# Patient Record
Sex: FEMALE | Race: WHITE | NOT HISPANIC OR LATINO | Employment: FULL TIME | URBAN - METROPOLITAN AREA
[De-identification: names, ages, dates, MRNs, and addresses within clinical notes are randomized per-mention and may not be internally consistent; named-entity substitution may affect disease eponyms.]

---

## 2023-04-19 LAB — CHORIOGONADOTROPIN (MIU/ML) IN SER/PLAS: 243 MIU/ML

## 2023-04-21 LAB — CHORIOGONADOTROPIN (MIU/ML) IN SER/PLAS: 827 MIU/ML

## 2023-04-22 LAB — PROGESTERONE (NG/ML) IN SER/PLAS: 12.7 NG/ML

## 2023-05-10 LAB
ERYTHROCYTE DISTRIBUTION WIDTH (RATIO) BY AUTOMATED COUNT: 13.2 % (ref 11.5–14.5)
ERYTHROCYTE MEAN CORPUSCULAR HEMOGLOBIN CONCENTRATION (G/DL) BY AUTOMATED: 32.4 G/DL (ref 32–36)
ERYTHROCYTE MEAN CORPUSCULAR VOLUME (FL) BY AUTOMATED COUNT: 83 FL (ref 80–100)
ERYTHROCYTES (10*6/UL) IN BLOOD BY AUTOMATED COUNT: 4.43 X10E12/L (ref 4–5.2)
HEMATOCRIT (%) IN BLOOD BY AUTOMATED COUNT: 36.7 % (ref 36–46)
HEMOGLOBIN (G/DL) IN BLOOD: 11.9 G/DL (ref 12–16)
LEUKOCYTES (10*3/UL) IN BLOOD BY AUTOMATED COUNT: 8 X10E9/L (ref 4.4–11.3)
PLATELETS (10*3/UL) IN BLOOD AUTOMATED COUNT: 270 X10E9/L (ref 150–450)
REFLEX ADDED, ANEMIA PANEL: ABNORMAL

## 2023-05-11 LAB
ABO GROUP (TYPE) IN BLOOD: NORMAL
ANTIBODY SCREEN: NORMAL
CHLAMYDIA TRACH., AMPLIFIED: NEGATIVE
ESTIMATED AVERAGE GLUCOSE FOR HBA1C: 123 MG/DL
HEMOGLOBIN A1C/HEMOGLOBIN TOTAL IN BLOOD: 5.9 %
HEPATITIS B VIRUS SURFACE AG PRESENCE IN SERUM: NONREACTIVE
HIV 1/ 2 AG/AB SCREEN: NONREACTIVE
N. GONORRHEA, AMPLIFIED: NEGATIVE
RH FACTOR: NORMAL
SYPHILIS TOTAL AB: NONREACTIVE

## 2023-05-12 LAB
RUBELLA VIRUS IGG AB: POSITIVE
URINE CULTURE: NORMAL

## 2023-07-01 LAB
GLUCOSE THREE HOUR: 56 MG/DL
GLUCOSE TWO HOUR: 103 MG/DL
GLUCOSE, FASTING: 80 MG/DL
GLUCOSE, ONE HOUR: 140 MG/DL
GTTCM: NORMAL

## 2023-09-01 LAB — URINE CULTURE: NORMAL

## 2023-09-15 PROBLEM — R30.0 DYSURIA: Status: ACTIVE | Noted: 2023-09-15

## 2023-09-15 PROBLEM — M25.532 LEFT WRIST PAIN: Status: ACTIVE | Noted: 2023-09-15

## 2023-09-15 PROBLEM — O36.80X0 PREGNANCY WITH INCONCLUSIVE FETAL VIABILITY (HHS-HCC): Status: ACTIVE | Noted: 2023-09-15

## 2023-09-15 PROBLEM — O09.299: Status: ACTIVE | Noted: 2023-09-15

## 2023-09-15 PROBLEM — F41.9 ANXIETY AND DEPRESSION: Status: ACTIVE | Noted: 2019-05-20

## 2023-09-15 PROBLEM — F42.9 OCD (OBSESSIVE COMPULSIVE DISORDER): Status: ACTIVE | Noted: 2023-09-15

## 2023-09-15 PROBLEM — O02.1 MISSED ABORTION (HHS-HCC): Status: ACTIVE | Noted: 2023-09-15

## 2023-09-15 PROBLEM — F32.1 MAJOR DEPRESSIVE DISORDER, SINGLE EPISODE, MODERATE WITH ANXIOUS DISTRESS (MULTI): Status: ACTIVE | Noted: 2023-09-15

## 2023-09-15 PROBLEM — O36.5991 SGA (SMALL FOR GESTATIONAL AGE), FETAL, AFFECTING CARE OF MOTHER, ANTEPARTUM, UNSPECIFIED TRIMESTER, FETUS 1 (HHS-HCC): Status: ACTIVE | Noted: 2023-09-15

## 2023-09-15 PROBLEM — O99.810 GLUCOSE INTOLERANCE OF PREGNANCY (HHS-HCC): Status: ACTIVE | Noted: 2023-09-15

## 2023-09-15 PROBLEM — N96 RECURRENT PREGNANCY LOSS WITHOUT CURRENT PREGNANCY: Status: ACTIVE | Noted: 2023-09-15

## 2023-09-15 PROBLEM — F41.0 PANIC DISORDER: Status: ACTIVE | Noted: 2023-09-15

## 2023-09-15 PROBLEM — Z34.83 MULTIGRAVIDA IN THIRD TRIMESTER (HHS-HCC): Status: ACTIVE | Noted: 2023-09-15

## 2023-09-15 PROBLEM — N92.6 MISSED PERIOD: Status: ACTIVE | Noted: 2023-09-15

## 2023-09-15 PROBLEM — R31.9 HEMATURIA: Status: ACTIVE | Noted: 2023-09-15

## 2023-09-15 PROBLEM — F32.A ANXIETY AND DEPRESSION: Status: ACTIVE | Noted: 2019-05-20

## 2023-09-15 PROBLEM — O99.013 ANEMIA, ANTEPARTUM, THIRD TRIMESTER (HHS-HCC): Status: ACTIVE | Noted: 2023-09-15

## 2023-09-15 RX ORDER — FLUOXETINE HYDROCHLORIDE 40 MG/1
40 CAPSULE ORAL DAILY
COMMUNITY
Start: 2016-02-22 | End: 2024-04-23 | Stop reason: ALTCHOICE

## 2023-09-15 RX ORDER — NITROFURANTOIN 25; 75 MG/1; MG/1
100 CAPSULE ORAL 2 TIMES DAILY
COMMUNITY
Start: 2023-08-30 | End: 2023-10-05 | Stop reason: WASHOUT

## 2023-09-15 RX ORDER — BUSPIRONE HYDROCHLORIDE 15 MG/1
15 TABLET ORAL EVERY 24 HOURS
COMMUNITY
Start: 2018-04-20 | End: 2024-04-23 | Stop reason: ALTCHOICE

## 2023-09-15 RX ORDER — ASPIRIN 81 MG/1
2 TABLET ORAL DAILY
COMMUNITY
End: 2023-12-14 | Stop reason: HOSPADM

## 2023-09-15 RX ORDER — CALCIUM CARBONATE 300MG(750)
1 TABLET,CHEWABLE ORAL DAILY
COMMUNITY
End: 2024-04-23 | Stop reason: ALTCHOICE

## 2023-09-16 PROBLEM — R13.10 DYSPHAGIA: Status: ACTIVE | Noted: 2023-09-16

## 2023-09-19 ENCOUNTER — HOSPITAL ENCOUNTER (OUTPATIENT)
Dept: DATA CONVERSION | Facility: HOSPITAL | Age: 33
End: 2023-09-19
Attending: OBSTETRICS & GYNECOLOGY
Payer: COMMERCIAL

## 2023-09-19 DIAGNOSIS — O99.342 OTHER MENTAL DISORDERS COMPLICATING PREGNANCY, SECOND TRIMESTER (HHS-HCC): ICD-10-CM

## 2023-09-19 DIAGNOSIS — Z79.82 LONG TERM (CURRENT) USE OF ASPIRIN: ICD-10-CM

## 2023-09-19 DIAGNOSIS — Z20.822 CONTACT WITH AND (SUSPECTED) EXPOSURE TO COVID-19: ICD-10-CM

## 2023-09-19 DIAGNOSIS — R50.9 FEVER, UNSPECIFIED: ICD-10-CM

## 2023-09-19 DIAGNOSIS — F32.9 MAJOR DEPRESSIVE DISORDER, SINGLE EPISODE, UNSPECIFIED: ICD-10-CM

## 2023-09-19 DIAGNOSIS — Z79.899 OTHER LONG TERM (CURRENT) DRUG THERAPY: ICD-10-CM

## 2023-09-19 DIAGNOSIS — R51.9 HEADACHE, UNSPECIFIED: ICD-10-CM

## 2023-09-19 DIAGNOSIS — Z3A.26 26 WEEKS GESTATION OF PREGNANCY (HHS-HCC): ICD-10-CM

## 2023-09-19 DIAGNOSIS — O09.292 SUPERVISION OF PREGNANCY WITH OTHER POOR REPRODUCTIVE OR OBSTETRIC HISTORY, SECOND TRIMESTER (HHS-HCC): ICD-10-CM

## 2023-09-19 DIAGNOSIS — O26.892 OTHER SPECIFIED PREGNANCY RELATED CONDITIONS, SECOND TRIMESTER (HHS-HCC): ICD-10-CM

## 2023-09-19 LAB
ERYTHROCYTE DISTRIBUTION WIDTH (RATIO) BY AUTOMATED COUNT: 12.8 % (ref 11.5–14.5)
ERYTHROCYTE MEAN CORPUSCULAR HEMOGLOBIN CONCENTRATION (G/DL) BY AUTOMATED: 32.6 G/DL (ref 32–36)
ERYTHROCYTE MEAN CORPUSCULAR VOLUME (FL) BY AUTOMATED COUNT: 86 FL (ref 80–100)
ERYTHROCYTES (10*6/UL) IN BLOOD BY AUTOMATED COUNT: 3.78 X10E12/L (ref 4–5.2)
FLU A RESULT: NOT DETECTED
FLU B RESULT: NOT DETECTED
HEMATOCRIT (%) IN BLOOD BY AUTOMATED COUNT: 32.5 % (ref 36–46)
HEMOGLOBIN (G/DL) IN BLOOD: 10.6 G/DL (ref 12–16)
LEUKOCYTES (10*3/UL) IN BLOOD BY AUTOMATED COUNT: 12.2 X10E9/L (ref 4.4–11.3)
NRBC (PER 100 WBCS) BY AUTOMATED COUNT: 0 /100 WBC (ref 0–0)
PLATELETS (10*3/UL) IN BLOOD AUTOMATED COUNT: 296 X10E9/L (ref 150–450)
SARS-COV-2 RESULT: NOT DETECTED

## 2023-09-29 VITALS — HEIGHT: 61 IN | WEIGHT: 221.56 LBS | BODY MASS INDEX: 41.83 KG/M2

## 2023-09-30 NOTE — PROGRESS NOTES
Current Stage:   Stage: Triage     OB Dating:   EDC/EGA:  ·  Final JEM 25-Dec-2023   ·  EGA 26.1     Subjective Data:   Antepartum:  Vaginal Bleeding: No   Contractions/Abdominal Pain: No   Discharge/Loss of Fluid: No   Fetal Movement: Good   Fevers/Chills: Yes   Antepartum:    34yo  at 26.1wga by LMP c/w 7wk US who is presenting for fevers, chills, and headache. Around 18:00 she started feeling feverish and took her temperature and  it was 101. After eating dinner, she took tylenol around 20:00 and laid down. Around 22:30 she woke up feeling shaky and her temperature was 103.6. She called her on call OB who told her to come to triage. She endorses fevers, chills, headache (severity  8/10). She denies any chest pain, shortness of breath, nausea, vomiting. Of note, her 2 year old and  were sick with similar symptoms last week and her 12 year old currently has symptoms and was checked for flu and covid at pediatrician today (results  pending).     Denies LOF, VB, or contractions. Endorses good FM.    Pregnancy notable for:  - major depressive disorder, on buspirone and fluoxetine  - h/o forceps delivery    OBHx: SAB x4   , forceps delivery, 7#15oz    in [place], precipitous, 7#9oz    GynHx: Denies h/o STIs. One abnormal pap in , normal since  PMH: as above  PSH: D&C (), Tonsillectomy ()  Meds: buspirone, fluoxetine, bASA, PNV  All: oxycodone (hives)  FMHx: Breast cancer in maternal grandmother in her 30s,  Soc: denies x3        Objective Information:    Objective Information:      T   P  R  BP   MAP  SpO2   Value  37.5  84  18  123/63   84  99%  Date/Time  1:48  2:00  0:41  0:41   0:41  2:00  Range  (37.5C - 37.8C )  (78 - 85 )  (18 - 18 )  (123 - 123 )/ (63 - 63 )  (84 - 84 )  (97% - 100% )  Highest temp of 37.8 C was recorded at  0:41      Pain reported at  0:41: 5 = Moderate      Physical Exam:   Constitutional: Awake, alert, resting  comfortably   Eyes: Clear sclera   ENMT: MMM   Head/Neck: Atraumatic   Respiratory/Thorax: Breathing comfortably on RA   Cardiovascular: Warm and well-perfused   Gastrointestinal: Gravid   Musculoskeletal: Moving all four extremities   Neurological: Alert and oriented, no gross motor  or sensory deficits   Psychological: Appropriate affect   Skin: Warm and dry     Recent Lab Results:    Results:        I have reviewed these laboratory results:    Complete Blood Count  19-Sep-2023 01:31:00      Result Value    White Blood Cell Count  12.2   H   Nucleated Erythrocyte Count  0.0    Red Blood Cell Count  3.78   L   HGB  10.6   L   HCT  32.5   L   MCV  86    MCHC  32.6    PLT  296    RDW-CV  12.8         Testing:   NST Interpretation - Baby A:  ·  Baseline    ·  Variability moderate (amplitude range 6 to 25 bpm)   ·  Interpretation Reactive (2 15x15 accels)   ·  Accelerations +   ·  Decelerations -     Assessment and Plan:   Assessment:    34 YO  at 26.1 wga by LMP c/w 7wk US who is presenting for fevers, chills, and headache.     Fevers, chills, headache  -VS stable: normotensive, non tachycardic  -1L LR bolus given  -Acetaminophen, benadryl, reglan for HA, with improvement  -Covid and flu negative  -CBC with WBC of 12.2  -NST reactive  -most likely viral etiology    Return precautions given. Discharge home.    Patient seen and discussed w/ resident Dr. Jamie Pryor, MS3    Agree with medical student note. Necessary changes made to the body of the text.  Seen and discussed with Dr. Elaine Ayala MD PGY-1      Attestation:   Note Completion:  I am a:  Resident/Fellow   Attending Attestation I saw and evaluated the patient.  I personally obtained the key and critical portions of the history and physical exam or was physically present for key and  critical portions performed by the resident/fellow. I reviewed the resident/fellow?s documentation and discussed the patient with the  resident/fellow.  I agree with the resident/fellow?s medical decision making as documented in the note.     I personally evaluated the patient on 19-Sep-2023         Electronic Signatures:  Doreen Pryor (MED STUD)  (Signed 19-Sep-2023 02:26)   Authored: Current Stage, OB Dating, Subjective Data,  Objective Data, Assessment and Plan, Note Completion  Hoa Henry)  (Signed 19-Sep-2023 03:41)   Authored: Note Completion   Co-Signer: Assessment and Plan, Note Completion  Bety Ayala (Resident))  (Signed 19-Sep-2023 03:13)   Authored: Subjective Data, Objective Data,   Testing, Assessment and Plan, Note Completion      Last Updated: 19-Sep-2023 03:41 by Hoa Henry)

## 2023-10-04 PROBLEM — N92.6 MISSED PERIOD: Status: RESOLVED | Noted: 2023-09-15 | Resolved: 2023-10-04

## 2023-10-04 PROBLEM — O36.80X0 PREGNANCY WITH INCONCLUSIVE FETAL VIABILITY (HHS-HCC): Status: RESOLVED | Noted: 2023-09-15 | Resolved: 2023-10-04

## 2023-10-04 PROBLEM — O02.1 MISSED ABORTION (HHS-HCC): Status: RESOLVED | Noted: 2023-09-15 | Resolved: 2023-10-04

## 2023-10-05 ENCOUNTER — ROUTINE PRENATAL (OUTPATIENT)
Dept: OBSTETRICS AND GYNECOLOGY | Facility: CLINIC | Age: 33
End: 2023-10-05
Payer: COMMERCIAL

## 2023-10-05 VITALS — DIASTOLIC BLOOD PRESSURE: 58 MMHG | WEIGHT: 214 LBS | BODY MASS INDEX: 40.88 KG/M2 | SYSTOLIC BLOOD PRESSURE: 102 MMHG

## 2023-10-05 DIAGNOSIS — O99.810 GLUCOSE INTOLERANCE OF PREGNANCY (HHS-HCC): ICD-10-CM

## 2023-10-05 DIAGNOSIS — O09.293 CURRENT PREGNANCY IN THIRD TRIMESTER WITH HISTORY OF SPONTANEOUS ABORTION DURING PRIOR PREGNANCY (HHS-HCC): Primary | ICD-10-CM

## 2023-10-05 DIAGNOSIS — Z23 NEED FOR DIPHTHERIA-TETANUS-PERTUSSIS (TDAP) VACCINE: ICD-10-CM

## 2023-10-05 PROBLEM — O36.5991 SGA (SMALL FOR GESTATIONAL AGE), FETAL, AFFECTING CARE OF MOTHER, ANTEPARTUM, UNSPECIFIED TRIMESTER, FETUS 1 (HHS-HCC): Status: RESOLVED | Noted: 2023-09-15 | Resolved: 2023-10-05

## 2023-10-05 PROCEDURE — 90472 IMMUNIZATION ADMIN EACH ADD: CPT | Performed by: OBSTETRICS & GYNECOLOGY

## 2023-10-05 PROCEDURE — 0501F PRENATAL FLOW SHEET: CPT | Performed by: OBSTETRICS & GYNECOLOGY

## 2023-10-05 PROCEDURE — 90686 IIV4 VACC NO PRSV 0.5 ML IM: CPT | Performed by: OBSTETRICS & GYNECOLOGY

## 2023-10-05 PROCEDURE — 90715 TDAP VACCINE 7 YRS/> IM: CPT | Performed by: OBSTETRICS & GYNECOLOGY

## 2023-10-05 PROCEDURE — 90471 IMMUNIZATION ADMIN: CPT | Performed by: OBSTETRICS & GYNECOLOGY

## 2023-10-05 NOTE — PROGRESS NOTES
"Subjective   Patient ID [id number]   Veronica Cedeno is a 33 y.o.  at 28w3d with a working estimated date of delivery of 2023, by Ultrasound who presents for a routine prenatal visit. She denies vaginal bleeding, leakage of fluid, decreased fetal movements, or contractions.    Her pregnancy is complicated by:  Anxiety, history of prior miscarriage    Objective   Physical Exam:   Weight: 97.1 kg (214 lb)  Expected Total Weight Gain: 5 kg (11 lb)-9 kg (19 lb)   Pregravid BMI: 39.54  BP: 102/58  Fetal Heart Rate: 140 Fundal Height (cm): 28 cm Presentation: Vertex           Prenatal Labs  Urine Dip:  No results found for: \"KETONESU\", \"GLUCOSEUR\", \"LEUKOCYTESUR\"  Lab Results   Component Value Date    HGB 10.6 (L) 2023    HCT 32.5 (L) 2023    HEPBSAG NONREACTIVE 05/10/2023            Assessment/Plan   Problem List Items Addressed This Visit             ICD-10-CM    Pregnancy with history of miscarriage - Primary O09.299    Glucose intolerance of pregnancy O99.810     Continue prenatal vitamin.  Labs reviewed.  She will proceed with glucola tomorrow.   Tdap and flu vaccines were given today.  Expected mode of delivery vaginal  Follow up in 2 week for a routine prenatal visit.  "

## 2023-10-15 PROBLEM — Z3A.30 30 WEEKS GESTATION OF PREGNANCY (HHS-HCC): Status: ACTIVE | Noted: 2023-10-15

## 2023-10-15 NOTE — ASSESSMENT & PLAN NOTE
NIPS returned risk reducing. History of four spontaneous pregnancy losses.   Desires 39 week induction due to rapid delivery.  Initial Hgb A1c returned elevated at 5.9%. Passed early GTT with no elevated levels.   She has GTT ordered for elevated glucola.

## 2023-10-18 ENCOUNTER — LAB (OUTPATIENT)
Dept: LAB | Facility: LAB | Age: 33
End: 2023-10-18
Payer: COMMERCIAL

## 2023-10-18 DIAGNOSIS — Z34.82 ENCOUNTER FOR SUPERVISION OF OTHER NORMAL PREGNANCY, SECOND TRIMESTER (HHS-HCC): Primary | ICD-10-CM

## 2023-10-18 DIAGNOSIS — O99.810 GLUCOSE INTOLERANCE OF PREGNANCY (HHS-HCC): Primary | ICD-10-CM

## 2023-10-18 LAB
ERYTHROCYTE [DISTWIDTH] IN BLOOD BY AUTOMATED COUNT: 12.8 % (ref 11.5–14.5)
FERRITIN SERPL-MCNC: 16 NG/ML
GLUCOSE 1H P 50 G GLC PO SERPL-MCNC: 191 MG/DL
HCT VFR BLD AUTO: 32.6 % (ref 36–46)
HGB BLD-MCNC: 10.4 G/DL (ref 12–16)
IRON SATN MFR SERPL: NORMAL %
IRON SERPL-MCNC: 45 UG/DL
MCH RBC QN AUTO: 27.2 PG (ref 26–34)
MCHC RBC AUTO-ENTMCNC: 31.9 G/DL (ref 32–36)
MCV RBC AUTO: 85 FL (ref 80–100)
NRBC BLD-RTO: 0 /100 WBCS (ref 0–0)
PLATELET # BLD AUTO: 289 X10*3/UL (ref 150–450)
PMV BLD AUTO: 10.6 FL (ref 7.5–11.5)
RBC # BLD AUTO: 3.82 X10*6/UL (ref 4–5.2)
REFLEX ADDED, ANEMIA PANEL: NORMAL
T PALLIDUM AB SER QL: NONREACTIVE
TIBC SERPL-MCNC: NORMAL UG/DL
UIBC SERPL-MCNC: >450 UG/DL
WBC # BLD AUTO: 8.9 X10*3/UL (ref 4.4–11.3)

## 2023-10-18 PROCEDURE — 82607 VITAMIN B-12: CPT

## 2023-10-18 PROCEDURE — 82728 ASSAY OF FERRITIN: CPT

## 2023-10-18 PROCEDURE — 82947 ASSAY GLUCOSE BLOOD QUANT: CPT

## 2023-10-18 PROCEDURE — 83550 IRON BINDING TEST: CPT

## 2023-10-18 PROCEDURE — 82746 ASSAY OF FOLIC ACID SERUM: CPT

## 2023-10-18 PROCEDURE — 85027 COMPLETE CBC AUTOMATED: CPT

## 2023-10-18 PROCEDURE — 86780 TREPONEMA PALLIDUM: CPT

## 2023-10-18 PROCEDURE — 36415 COLL VENOUS BLD VENIPUNCTURE: CPT

## 2023-10-18 NOTE — RESULT ENCOUNTER NOTE
Glucola is elevated. I have placed an order for 3 hour glucose tolerance test. This should be done within a few days.

## 2023-10-19 ENCOUNTER — ROUTINE PRENATAL (OUTPATIENT)
Dept: OBSTETRICS AND GYNECOLOGY | Facility: CLINIC | Age: 33
End: 2023-10-19
Payer: COMMERCIAL

## 2023-10-19 VITALS — DIASTOLIC BLOOD PRESSURE: 64 MMHG | WEIGHT: 219 LBS | SYSTOLIC BLOOD PRESSURE: 118 MMHG | BODY MASS INDEX: 41.83 KG/M2

## 2023-10-19 DIAGNOSIS — Z3A.30 30 WEEKS GESTATION OF PREGNANCY (HHS-HCC): ICD-10-CM

## 2023-10-19 DIAGNOSIS — O99.810 GLUCOSE INTOLERANCE OF PREGNANCY (HHS-HCC): ICD-10-CM

## 2023-10-19 DIAGNOSIS — O09.293 CURRENT PREGNANCY IN THIRD TRIMESTER WITH HISTORY OF SPONTANEOUS ABORTION DURING PRIOR PREGNANCY (HHS-HCC): Primary | ICD-10-CM

## 2023-10-19 LAB
FOLATE SERPL-MCNC: 21.5 NG/ML
VIT B12 SERPL-MCNC: 183 PG/ML

## 2023-10-19 PROCEDURE — 0501F PRENATAL FLOW SHEET: CPT | Performed by: OBSTETRICS & GYNECOLOGY

## 2023-10-19 NOTE — PROGRESS NOTES
"Subjective   Patient ID [id number]   Veronica Cedeno is a 33 y.o.  at 30w3d with a working estimated date of delivery of 2023, by Ultrasound who presents for a routine prenatal visit. She denies vaginal bleeding, leakage of fluid, decreased fetal movements, or contractions. She did have glucola yesterday that returned elevated. She will proceed with GTT today.     Her pregnancy is complicated by:  obesity    Objective   Physical Exam:   Weight: 99.3 kg (219 lb)  Expected Total Weight Gain: 5 kg (11 lb)-9 kg (19 lb)   Pregravid BMI: 39.54  BP: 118/64  Fetal Heart Rate: 145 Fundal Height (cm): 30 cm Presentation: Vertex           Prenatal Labs  Urine Dip:  No results found for: \"KETONESU\", \"GLUCOSEUR\", \"LEUKOCYTESUR\"  Lab Results   Component Value Date    HGB 10.4 (L) 10/18/2023    HCT 32.6 (L) 10/18/2023    ABO O 05/10/2023    HEPBSAG NONREACTIVE 05/10/2023     No results found for: \"PAPPA\", \"AFP\", \"HCG\", \"ESTRIOL\", \"INHBA\"  No results found for: \"GLUF\", \"GLUT1\", \"UWYJKXI5DG\", \"BUCAYSY1CY\"    Imaging  The most recent ultrasound was performed on   with a study GA of   and EFW of  .          Assessment/Plan   Problem List Items Addressed This Visit             ICD-10-CM    Pregnancy with history of miscarriage - Primary O09.299    Glucose intolerance of pregnancy O99.810    30 weeks gestation of pregnancy Z3A.30     NIPS returned risk reducing. History of four spontaneous pregnancy losses.   Desires 39 week induction due to rapid delivery.  Initial Hgb A1c returned elevated at 5.9%. Passed early GTT with no elevated levels.   She has GTT ordered for elevated glucola.          Continue prenatal vitamin.  Labs reviewed.  GBS taken.  Expected mode of delivery vaginal  Follow up in 2 weeks for a routine prenatal visit.  "

## 2023-10-20 ENCOUNTER — LAB (OUTPATIENT)
Dept: LAB | Facility: LAB | Age: 33
End: 2023-10-20
Payer: COMMERCIAL

## 2023-10-20 DIAGNOSIS — O99.810 GLUCOSE INTOLERANCE OF PREGNANCY (HHS-HCC): ICD-10-CM

## 2023-10-20 LAB
GLUCOSE 1H P 100 G GLC PO SERPL-MCNC: 196 MG/DL
GLUCOSE 2H P 100 G GLC PO SERPL-MCNC: 144 MG/DL
GLUCOSE 3H P 100 G GLC PO SERPL-MCNC: 115 MG/DL
GLUCOSE P FAST SERPL-MCNC: 92 MG/DL

## 2023-10-20 PROCEDURE — 82950 GLUCOSE TEST: CPT

## 2023-10-20 PROCEDURE — 36415 COLL VENOUS BLD VENIPUNCTURE: CPT

## 2023-10-20 PROCEDURE — 82952 GTT-ADDED SAMPLES: CPT

## 2023-10-20 NOTE — RESULT ENCOUNTER NOTE
Please inform Veronica she passed the GTT with one elevation at one hour. This suggests she is more sensitive to high carbohydrate meals, but does not suggest diabetes. My recommendation is that she minimize carbohydrates, especially simple sugars. She does not need to monitor glucose levels.

## 2023-10-31 PROBLEM — Z3A.32 32 WEEKS GESTATION OF PREGNANCY (HHS-HCC): Status: ACTIVE | Noted: 2023-10-15

## 2023-11-02 ENCOUNTER — ROUTINE PRENATAL (OUTPATIENT)
Dept: OBSTETRICS AND GYNECOLOGY | Facility: CLINIC | Age: 33
End: 2023-11-02
Payer: COMMERCIAL

## 2023-11-02 VITALS — BODY MASS INDEX: 40.5 KG/M2 | SYSTOLIC BLOOD PRESSURE: 118 MMHG | DIASTOLIC BLOOD PRESSURE: 70 MMHG | WEIGHT: 212 LBS

## 2023-11-02 DIAGNOSIS — O99.810 GLUCOSE INTOLERANCE OF PREGNANCY (HHS-HCC): ICD-10-CM

## 2023-11-02 DIAGNOSIS — Z3A.32 32 WEEKS GESTATION OF PREGNANCY (HHS-HCC): ICD-10-CM

## 2023-11-02 DIAGNOSIS — O09.293 CURRENT PREGNANCY IN THIRD TRIMESTER WITH HISTORY OF SPONTANEOUS ABORTION DURING PRIOR PREGNANCY (HHS-HCC): Primary | ICD-10-CM

## 2023-11-02 DIAGNOSIS — O21.9 NAUSEA AND VOMITING OF PREGNANCY, ANTEPARTUM (HHS-HCC): ICD-10-CM

## 2023-11-02 PROCEDURE — 0501F PRENATAL FLOW SHEET: CPT | Performed by: OBSTETRICS & GYNECOLOGY

## 2023-11-02 RX ORDER — METOCLOPRAMIDE 5 MG/1
5 TABLET ORAL 4 TIMES DAILY
Qty: 40 TABLET | Refills: 0 | Status: SHIPPED | OUTPATIENT
Start: 2023-11-02 | End: 2023-12-14 | Stop reason: HOSPADM

## 2023-11-02 NOTE — PROGRESS NOTES
"Subjective   Patient ID [id number]   Veronica Cedeno is a 33 y.o.  at 32w1d with a working estimated date of delivery of 2023, by Last Menstrual Period who presents for a routine prenatal visit. She denies vaginal bleeding, leakage of fluid, decreased fetal movements, or contractions.  She is noting vomiting in the morning for two weeks. She is bringing up food from dinner. The rest of the day she is eating fine. Will try reglan in the evening.   Low back is uncomfortable. Stretches were reviewed.       Objective   Physical Exam  Weight: 96.2 kg (212 lb)  Expected Total Weight Gain: 5 kg (11 lb)-9 kg (19 lb)   Pregravid BMI: 39.54  BP: 118/70  Fetal Heart Rate: 135 Fundal Height (cm): 32 cm    Prenatal Labs  Urine dip:  No results found for: \"KETONESU\", \"GLUCOSEUR\", \"LEUKOCYTESUR\"    Lab Results   Component Value Date    HGB 10.4 (L) 10/18/2023    HCT 32.6 (L) 10/18/2023    ABO O 05/10/2023    HEPBSAG NONREACTIVE 05/10/2023         Problem List Items Addressed This Visit       Pregnancy with history of miscarriage - Primary    Overview     History of four spontaneous pregnancy losses.          Glucose intolerance of pregnancy    Overview     Initial Hgb A1c returned elevated at 5.9%. Passed early GTT with no elevated levels.   Glucola returned elevated when done at 30 weeks. Passed GTT with elevation at 1 hour only.         32 weeks gestation of pregnancy    Overview     NIPS returned risk reducing. History of four spontaneous pregnancy losses.   Desires 39 week induction due to rapid delivery.          Other Visit Diagnoses       Nausea and vomiting of pregnancy, antepartum        Relevant Medications    metoclopramide (Reglan) 5 mg tablet             Continue prenatal vitamin.  Labs reviewed.  Reglan was prescribed. Follow up in 2 weeks.   Follow up as scheduled for a routine prenatal visit.  "

## 2023-11-10 PROBLEM — Z3A.34 34 WEEKS GESTATION OF PREGNANCY (HHS-HCC): Status: ACTIVE | Noted: 2023-10-15

## 2023-11-14 ENCOUNTER — ROUTINE PRENATAL (OUTPATIENT)
Dept: OBSTETRICS AND GYNECOLOGY | Facility: CLINIC | Age: 33
End: 2023-11-14
Payer: COMMERCIAL

## 2023-11-14 VITALS — WEIGHT: 216 LBS | BODY MASS INDEX: 41.26 KG/M2 | SYSTOLIC BLOOD PRESSURE: 122 MMHG | DIASTOLIC BLOOD PRESSURE: 70 MMHG

## 2023-11-14 DIAGNOSIS — Z3A.34 34 WEEKS GESTATION OF PREGNANCY (HHS-HCC): Primary | ICD-10-CM

## 2023-11-14 DIAGNOSIS — O99.810 GLUCOSE INTOLERANCE OF PREGNANCY (HHS-HCC): ICD-10-CM

## 2023-11-14 DIAGNOSIS — O09.293 CURRENT PREGNANCY IN THIRD TRIMESTER WITH HISTORY OF SPONTANEOUS ABORTION DURING PRIOR PREGNANCY (HHS-HCC): ICD-10-CM

## 2023-11-14 DIAGNOSIS — O99.891 BACK PAIN IN PREGNANCY (HHS-HCC): ICD-10-CM

## 2023-11-14 DIAGNOSIS — M54.9 BACK PAIN IN PREGNANCY (HHS-HCC): ICD-10-CM

## 2023-11-14 PROCEDURE — 0501F PRENATAL FLOW SHEET: CPT | Performed by: OBSTETRICS & GYNECOLOGY

## 2023-11-14 NOTE — PROGRESS NOTES
"Subjective   Patient ID [id number]   Veronica Cedeno is a 33 y.o. who presents for a routine prenatal visit. She denies vaginal bleeding, leakage of fluid, decreased fetal movements, or contractions.      Objective   Physical Exam  Weight: 98 kg (216 lb)  Expected Total Weight Gain: 5 kg (11 lb)-9 kg (19 lb)   Pregravid BMI: 39.54  BP: 122/70         Prenatal Labs  Urine dip:  No results found for: \"KETONESU\", \"GLUCOSEUR\", \"LEUKOCYTESUR\"    Lab Results   Component Value Date    HGB 10.4 (L) 10/18/2023    HCT 32.6 (L) 10/18/2023    ABO O 05/10/2023    HEPBSAG NONREACTIVE 05/10/2023         Problem List Items Addressed This Visit       Pregnancy with history of miscarriage    Overview     History of four spontaneous pregnancy losses.          Glucose intolerance of pregnancy    Overview     Initial Hgb A1c returned elevated at 5.9%. Passed early GTT with no elevated levels.   Glucola returned elevated when done at 30 weeks. Passed GTT with elevation at 1 hour only.         34 weeks gestation of pregnancy - Primary    Overview     NIPS returned risk reducing. History of four spontaneous pregnancy losses.   Desires 39 week induction due to rapid delivery.             Continue prenatal vitamin.  Labs reviewed.  Will refer to PT for back pain.  Follow up as scheduled for a routine prenatal visit.  "

## 2023-11-15 DIAGNOSIS — Z34.83 MULTIGRAVIDA IN THIRD TRIMESTER (HHS-HCC): Primary | ICD-10-CM

## 2023-11-28 PROBLEM — Z3A.36 36 WEEKS GESTATION OF PREGNANCY (HHS-HCC): Status: ACTIVE | Noted: 2023-10-15

## 2023-11-29 ENCOUNTER — ROUTINE PRENATAL (OUTPATIENT)
Dept: OBSTETRICS AND GYNECOLOGY | Facility: CLINIC | Age: 33
End: 2023-11-29
Payer: COMMERCIAL

## 2023-11-29 VITALS
SYSTOLIC BLOOD PRESSURE: 110 MMHG | BODY MASS INDEX: 30.38 KG/M2 | DIASTOLIC BLOOD PRESSURE: 70 MMHG | HEIGHT: 71 IN | WEIGHT: 217 LBS

## 2023-11-29 DIAGNOSIS — O09.293 CURRENT PREGNANCY IN THIRD TRIMESTER WITH HISTORY OF SPONTANEOUS ABORTION DURING PRIOR PREGNANCY (HHS-HCC): ICD-10-CM

## 2023-11-29 DIAGNOSIS — Z3A.36 36 WEEKS GESTATION OF PREGNANCY (HHS-HCC): Primary | ICD-10-CM

## 2023-11-29 DIAGNOSIS — O99.013 ANEMIA, ANTEPARTUM, THIRD TRIMESTER (HHS-HCC): ICD-10-CM

## 2023-11-29 PROCEDURE — 87081 CULTURE SCREEN ONLY: CPT

## 2023-11-29 PROCEDURE — 0501F PRENATAL FLOW SHEET: CPT | Performed by: OBSTETRICS & GYNECOLOGY

## 2023-11-29 RX ORDER — AMOXICILLIN AND CLAVULANATE POTASSIUM 875; 125 MG/1; MG/1
1 TABLET, FILM COATED ORAL 2 TIMES DAILY
COMMUNITY
Start: 2023-11-24 | End: 2023-12-04

## 2023-11-29 NOTE — PROGRESS NOTES
"Subjective   Patient ID [id number]   Veronica Cedeno is a 33 y.o. who presents for a routine prenatal visit. She denies vaginal bleeding, leakage of fluid, decreased fetal movements, or contractions. She did note some discharge yesterday but denies any itching or sign of infection. NST is reactive.      Objective   Physical Exam  Weight: 98.4 kg (217 lb)  Expected Total Weight Gain: 7 kg (15 lb)-11.5 kg (25 lb)   Pregravid BMI: 28.88  BP: 110/70  Fetal Heart Rate: 135 Fundal Height (cm): 34 cm    Prenatal Labs  Urine dip:  No results found for: \"KETONESU\", \"GLUCOSEUR\", \"LEUKOCYTESUR\"    Lab Results   Component Value Date    HGB 10.4 (L) 10/18/2023    HCT 32.6 (L) 10/18/2023    ABO O 05/10/2023    HEPBSAG NONREACTIVE 05/10/2023         Problem List Items Addressed This Visit       Anemia, antepartum, third trimester    Pregnancy with history of miscarriage    Overview     History of four spontaneous pregnancy losses.          36 weeks gestation of pregnancy - Primary    Overview     NIPS returned risk reducing. History of four spontaneous pregnancy losses.   Desires 39 week induction due to rapid delivery.             Continue prenatal vitamin.  Labs reviewed.  GBBS today.Will get EFW.  Follow up as scheduled for a routine prenatal visit.  "

## 2023-11-29 NOTE — PROCEDURES
Veronica Donahue Wilian, a  at 36w2d with an JEM of 2023, by Ultrasound, was seen at [facility] for a nonstress test.    Non-Stress Test   Baseline Fetal Heart Rate for Non-Stress Test: 135 BPM  Variability in Waveform for Non-Stress Test: Moderate  Accelerations in Non-Stress Test: Yes  Decelerations in Non-Stress Test: None  Contractions in Non-Stress Test: Not present                                       Procedures

## 2023-12-01 ENCOUNTER — ANCILLARY PROCEDURE (OUTPATIENT)
Dept: RADIOLOGY | Facility: CLINIC | Age: 33
End: 2023-12-01
Payer: COMMERCIAL

## 2023-12-01 DIAGNOSIS — Z34.90 ENCOUNTER FOR SUPERVISION OF NORMAL PREGNANCY, UNSPECIFIED, UNSPECIFIED TRIMESTER (HHS-HCC): ICD-10-CM

## 2023-12-01 PROCEDURE — 76816 OB US FOLLOW-UP PER FETUS: CPT | Performed by: OBSTETRICS & GYNECOLOGY

## 2023-12-01 PROCEDURE — 76816 OB US FOLLOW-UP PER FETUS: CPT

## 2023-12-01 PROCEDURE — 76819 FETAL BIOPHYS PROFIL W/O NST: CPT | Performed by: OBSTETRICS & GYNECOLOGY

## 2023-12-02 LAB — GP B STREP GENITAL QL CULT: NORMAL

## 2023-12-05 ENCOUNTER — APPOINTMENT (OUTPATIENT)
Dept: PHYSICAL THERAPY | Facility: CLINIC | Age: 33
End: 2023-12-05
Payer: COMMERCIAL

## 2023-12-07 ENCOUNTER — ROUTINE PRENATAL (OUTPATIENT)
Dept: OBSTETRICS AND GYNECOLOGY | Facility: CLINIC | Age: 33
End: 2023-12-07
Payer: COMMERCIAL

## 2023-12-07 VITALS — WEIGHT: 217 LBS | DIASTOLIC BLOOD PRESSURE: 70 MMHG | SYSTOLIC BLOOD PRESSURE: 118 MMHG | BODY MASS INDEX: 30.27 KG/M2

## 2023-12-07 DIAGNOSIS — O99.810 GLUCOSE INTOLERANCE OF PREGNANCY (HHS-HCC): ICD-10-CM

## 2023-12-07 DIAGNOSIS — Z3A.37 37 WEEKS GESTATION OF PREGNANCY (HHS-HCC): Primary | ICD-10-CM

## 2023-12-07 DIAGNOSIS — O09.293 CURRENT PREGNANCY IN THIRD TRIMESTER WITH HISTORY OF SPONTANEOUS ABORTION DURING PRIOR PREGNANCY (HHS-HCC): ICD-10-CM

## 2023-12-07 DIAGNOSIS — O99.013 ANEMIA, ANTEPARTUM, THIRD TRIMESTER (HHS-HCC): ICD-10-CM

## 2023-12-07 PROCEDURE — 59426 ANTEPARTUM CARE ONLY: CPT | Performed by: OBSTETRICS & GYNECOLOGY

## 2023-12-07 NOTE — PROGRESS NOTES
"Subjective   Patient ID [id number]   Veronica Cedeno is a 33 y.o. who presents for a routine prenatal visit. She denies vaginal bleeding, leakage of fluid, decreased fetal movements. She is having more cramping lately.      Objective   Physical Exam  Weight: 98.4 kg (217 lb)  Expected Total Weight Gain: 7 kg (15 lb)-11.5 kg (25 lb)   Pregravid BMI: 28.88  BP: 118/70  Fetal Heart Rate: 140 Fundal Height (cm): 34 cm    Prenatal Labs  Urine dip:  No results found for: \"KETONESU\", \"GLUCOSEUR\", \"LEUKOCYTESUR\"    Lab Results   Component Value Date    HGB 10.4 (L) 10/18/2023    HCT 32.6 (L) 10/18/2023    ABO O 05/10/2023    HEPBSAG NONREACTIVE 05/10/2023         Problem List Items Addressed This Visit       Anemia, antepartum, third trimester    Overview     She is taking iron.         Pregnancy with history of miscarriage    Overview     History of four spontaneous pregnancy losses.          Glucose intolerance of pregnancy    Overview     Initial Hgb A1c returned elevated at 5.9%. Passed early GTT with no elevated levels.   Glucola returned elevated when done at 30 weeks. Passed GTT with elevation at 1 hour only.         37 weeks gestation of pregnancy - Primary    Overview     NIPS returned risk reducing. History of four spontaneous pregnancy losses.   Desires 39 week induction due to rapid delivery.  EFW at 36 weeks 2992g 56%   Induction is 12/21 at 8 pm.             Continue prenatal vitamin.  Labs reviewed.    Follow up as scheduled for a routine prenatal visit.  "

## 2023-12-12 ENCOUNTER — ANESTHESIA (OUTPATIENT)
Dept: OBSTETRICS AND GYNECOLOGY | Facility: HOSPITAL | Age: 33
End: 2023-12-12
Payer: COMMERCIAL

## 2023-12-12 ENCOUNTER — ANESTHESIA EVENT (OUTPATIENT)
Dept: OBSTETRICS AND GYNECOLOGY | Facility: HOSPITAL | Age: 33
End: 2023-12-12
Payer: COMMERCIAL

## 2023-12-12 ENCOUNTER — HOSPITAL ENCOUNTER (INPATIENT)
Facility: HOSPITAL | Age: 33
LOS: 2 days | Discharge: HOME | End: 2023-12-14
Attending: OBSTETRICS & GYNECOLOGY | Admitting: OBSTETRICS & GYNECOLOGY
Payer: COMMERCIAL

## 2023-12-12 DIAGNOSIS — F32.A ANXIETY AND DEPRESSION: Primary | ICD-10-CM

## 2023-12-12 DIAGNOSIS — F41.9 ANXIETY AND DEPRESSION: Primary | ICD-10-CM

## 2023-12-12 PROBLEM — O47.9 UTERINE CONTRACTIONS (HHS-HCC): Status: ACTIVE | Noted: 2023-12-12

## 2023-12-12 PROBLEM — M54.42 ACUTE LEFT-SIDED LOW BACK PAIN WITH LEFT-SIDED SCIATICA: Status: ACTIVE | Noted: 2019-07-02

## 2023-12-12 PROBLEM — M51.27 LUMBOSACRAL DISC HERNIATION: Status: ACTIVE | Noted: 2019-07-02

## 2023-12-12 LAB
ABO GROUP (TYPE) IN BLOOD: NORMAL
ABO GROUP (TYPE) IN BLOOD: NORMAL
ALBUMIN SERPL BCP-MCNC: 3 G/DL (ref 3.4–5)
ALP SERPL-CCNC: 110 U/L (ref 33–110)
ALT SERPL W P-5'-P-CCNC: 8 U/L (ref 7–45)
ANION GAP SERPL CALC-SCNC: 14 MMOL/L (ref 10–20)
ANTIBODY SCREEN: NORMAL
AST SERPL W P-5'-P-CCNC: 14 U/L (ref 9–39)
BILIRUB SERPL-MCNC: 0.2 MG/DL (ref 0–1.2)
BUN SERPL-MCNC: 7 MG/DL (ref 6–23)
CALCIUM SERPL-MCNC: 8.4 MG/DL (ref 8.6–10.3)
CHLORIDE SERPL-SCNC: 104 MMOL/L (ref 98–107)
CO2 SERPL-SCNC: 20 MMOL/L (ref 21–32)
CREAT SERPL-MCNC: 0.51 MG/DL (ref 0.5–1.05)
ERYTHROCYTE [DISTWIDTH] IN BLOOD BY AUTOMATED COUNT: 13.3 % (ref 11.5–14.5)
GFR SERPL CREATININE-BSD FRML MDRD: >90 ML/MIN/1.73M*2
GLUCOSE SERPL-MCNC: 83 MG/DL (ref 74–99)
HCT VFR BLD AUTO: 30.3 % (ref 36–46)
HGB BLD-MCNC: 9.8 G/DL (ref 12–16)
MCH RBC QN AUTO: 25.4 PG (ref 26–34)
MCHC RBC AUTO-ENTMCNC: 32.3 G/DL (ref 32–36)
MCV RBC AUTO: 79 FL (ref 80–100)
NRBC BLD-RTO: 0 /100 WBCS (ref 0–0)
PLATELET # BLD AUTO: 327 X10*3/UL (ref 150–450)
POTASSIUM SERPL-SCNC: 3.8 MMOL/L (ref 3.5–5.3)
PROT SERPL-MCNC: 5.7 G/DL (ref 6.4–8.2)
RBC # BLD AUTO: 3.86 X10*6/UL (ref 4–5.2)
RH FACTOR (ANTIGEN D): NORMAL
RH FACTOR (ANTIGEN D): NORMAL
SODIUM SERPL-SCNC: 134 MMOL/L (ref 136–145)
WBC # BLD AUTO: 11.9 X10*3/UL (ref 4.4–11.3)

## 2023-12-12 PROCEDURE — 85027 COMPLETE CBC AUTOMATED: CPT | Performed by: ADVANCED PRACTICE MIDWIFE

## 2023-12-12 PROCEDURE — 99215 OFFICE O/P EST HI 40 MIN: CPT

## 2023-12-12 PROCEDURE — 86920 COMPATIBILITY TEST SPIN: CPT

## 2023-12-12 PROCEDURE — 2500000005 HC RX 250 GENERAL PHARMACY W/O HCPCS: Performed by: NURSE ANESTHETIST, CERTIFIED REGISTERED

## 2023-12-12 PROCEDURE — 2500000004 HC RX 250 GENERAL PHARMACY W/ HCPCS (ALT 636 FOR OP/ED): Performed by: ADVANCED PRACTICE MIDWIFE

## 2023-12-12 PROCEDURE — 86780 TREPONEMA PALLIDUM: CPT | Mod: AHULAB | Performed by: ADVANCED PRACTICE MIDWIFE

## 2023-12-12 PROCEDURE — 3700000001 HC GENERAL ANESTHESIA TIME - INITIAL BASE CHARGE: Performed by: NURSE ANESTHETIST, CERTIFIED REGISTERED

## 2023-12-12 PROCEDURE — 2500000004 HC RX 250 GENERAL PHARMACY W/ HCPCS (ALT 636 FOR OP/ED): Performed by: NURSE ANESTHETIST, CERTIFIED REGISTERED

## 2023-12-12 PROCEDURE — 86901 BLOOD TYPING SEROLOGIC RH(D): CPT | Performed by: ADVANCED PRACTICE MIDWIFE

## 2023-12-12 PROCEDURE — 99222 1ST HOSP IP/OBS MODERATE 55: CPT | Performed by: ADVANCED PRACTICE MIDWIFE

## 2023-12-12 PROCEDURE — 80053 COMPREHEN METABOLIC PANEL: CPT | Performed by: ADVANCED PRACTICE MIDWIFE

## 2023-12-12 PROCEDURE — 36415 COLL VENOUS BLD VENIPUNCTURE: CPT | Performed by: ADVANCED PRACTICE MIDWIFE

## 2023-12-12 PROCEDURE — 1120000001 HC OB PRIVATE ROOM DAILY

## 2023-12-12 PROCEDURE — 3700000002 HC GENERAL ANESTHESIA TIME - EACH INCREMENTAL 1 MINUTE: Performed by: NURSE ANESTHETIST, CERTIFIED REGISTERED

## 2023-12-12 RX ORDER — FLUOXETINE HYDROCHLORIDE 20 MG/1
40 CAPSULE ORAL NIGHTLY
Status: DISCONTINUED | OUTPATIENT
Start: 2023-12-12 | End: 2023-12-14 | Stop reason: HOSPADM

## 2023-12-12 RX ORDER — LIDOCAINE HYDROCHLORIDE 20 MG/ML
INJECTION, SOLUTION EPIDURAL; INFILTRATION; INTRACAUDAL; PERINEURAL AS NEEDED
Status: DISCONTINUED | OUTPATIENT
Start: 2023-12-12 | End: 2023-12-13

## 2023-12-12 RX ORDER — LIDOCAINE HYDROCHLORIDE 10 MG/ML
30 INJECTION INFILTRATION; PERINEURAL ONCE AS NEEDED
Status: DISCONTINUED | OUTPATIENT
Start: 2023-12-12 | End: 2023-12-13

## 2023-12-12 RX ORDER — ONDANSETRON HYDROCHLORIDE 2 MG/ML
4 INJECTION, SOLUTION INTRAVENOUS EVERY 6 HOURS PRN
Status: DISCONTINUED | OUTPATIENT
Start: 2023-12-12 | End: 2023-12-13

## 2023-12-12 RX ORDER — LOPERAMIDE HYDROCHLORIDE 2 MG/1
4 CAPSULE ORAL EVERY 2 HOUR PRN
Status: DISCONTINUED | OUTPATIENT
Start: 2023-12-12 | End: 2023-12-13

## 2023-12-12 RX ORDER — FENTANYL/ROPIVACAINE/NS/PF 2MCG/ML-.2
0-25 PLASTIC BAG, INJECTION (ML) INJECTION CONTINUOUS
Status: DISCONTINUED | OUTPATIENT
Start: 2023-12-12 | End: 2023-12-13

## 2023-12-12 RX ORDER — ONDANSETRON 4 MG/1
4 TABLET, FILM COATED ORAL EVERY 6 HOURS PRN
Status: DISCONTINUED | OUTPATIENT
Start: 2023-12-12 | End: 2023-12-13

## 2023-12-12 RX ORDER — OXYTOCIN 10 [USP'U]/ML
10 INJECTION, SOLUTION INTRAMUSCULAR; INTRAVENOUS ONCE AS NEEDED
Status: DISCONTINUED | OUTPATIENT
Start: 2023-12-12 | End: 2023-12-13

## 2023-12-12 RX ORDER — BUSPIRONE HYDROCHLORIDE 5 MG/1
15 TABLET ORAL NIGHTLY
Status: DISCONTINUED | OUTPATIENT
Start: 2023-12-12 | End: 2023-12-14 | Stop reason: HOSPADM

## 2023-12-12 RX ORDER — METHYLERGONOVINE MALEATE 0.2 MG/ML
0.2 INJECTION INTRAVENOUS ONCE AS NEEDED
Status: DISCONTINUED | OUTPATIENT
Start: 2023-12-12 | End: 2023-12-13

## 2023-12-12 RX ORDER — HYDRALAZINE HYDROCHLORIDE 20 MG/ML
5 INJECTION INTRAMUSCULAR; INTRAVENOUS ONCE AS NEEDED
Status: DISCONTINUED | OUTPATIENT
Start: 2023-12-12 | End: 2023-12-13

## 2023-12-12 RX ORDER — METOCLOPRAMIDE 10 MG/1
10 TABLET ORAL EVERY 6 HOURS PRN
Status: DISCONTINUED | OUTPATIENT
Start: 2023-12-12 | End: 2023-12-13

## 2023-12-12 RX ORDER — SODIUM CHLORIDE, SODIUM LACTATE, POTASSIUM CHLORIDE, CALCIUM CHLORIDE 600; 310; 30; 20 MG/100ML; MG/100ML; MG/100ML; MG/100ML
125 INJECTION, SOLUTION INTRAVENOUS CONTINUOUS
Status: DISCONTINUED | OUTPATIENT
Start: 2023-12-12 | End: 2023-12-13

## 2023-12-12 RX ORDER — TERBUTALINE SULFATE 1 MG/ML
0.25 INJECTION SUBCUTANEOUS ONCE AS NEEDED
Status: DISCONTINUED | OUTPATIENT
Start: 2023-12-12 | End: 2023-12-13

## 2023-12-12 RX ORDER — NIFEDIPINE 10 MG/1
10 CAPSULE ORAL ONCE AS NEEDED
Status: DISCONTINUED | OUTPATIENT
Start: 2023-12-12 | End: 2023-12-13

## 2023-12-12 RX ORDER — LABETALOL HYDROCHLORIDE 5 MG/ML
20 INJECTION, SOLUTION INTRAVENOUS ONCE AS NEEDED
Status: DISCONTINUED | OUTPATIENT
Start: 2023-12-12 | End: 2023-12-13

## 2023-12-12 RX ORDER — OXYTOCIN/0.9 % SODIUM CHLORIDE 30/500 ML
60 PLASTIC BAG, INJECTION (ML) INTRAVENOUS ONCE AS NEEDED
Status: DISCONTINUED | OUTPATIENT
Start: 2023-12-12 | End: 2023-12-13

## 2023-12-12 RX ORDER — METOCLOPRAMIDE HYDROCHLORIDE 5 MG/ML
10 INJECTION INTRAMUSCULAR; INTRAVENOUS EVERY 6 HOURS PRN
Status: DISCONTINUED | OUTPATIENT
Start: 2023-12-12 | End: 2023-12-13

## 2023-12-12 RX ORDER — LIDOCAINE HCL/EPINEPHRINE/PF 2%-1:200K
VIAL (ML) INJECTION AS NEEDED
Status: DISCONTINUED | OUTPATIENT
Start: 2023-12-12 | End: 2023-12-13

## 2023-12-12 RX ORDER — CARBOPROST TROMETHAMINE 250 UG/ML
250 INJECTION, SOLUTION INTRAMUSCULAR ONCE AS NEEDED
Status: DISCONTINUED | OUTPATIENT
Start: 2023-12-12 | End: 2023-12-13

## 2023-12-12 RX ORDER — MISOPROSTOL 200 UG/1
800 TABLET ORAL ONCE AS NEEDED
Status: DISCONTINUED | OUTPATIENT
Start: 2023-12-12 | End: 2023-12-13

## 2023-12-12 RX ORDER — TRANEXAMIC ACID 100 MG/ML
1000 INJECTION, SOLUTION INTRAVENOUS ONCE AS NEEDED
Status: DISCONTINUED | OUTPATIENT
Start: 2023-12-12 | End: 2023-12-13

## 2023-12-12 RX ADMIN — SODIUM CHLORIDE, POTASSIUM CHLORIDE, SODIUM LACTATE AND CALCIUM CHLORIDE 500 ML: 600; 310; 30; 20 INJECTION, SOLUTION INTRAVENOUS at 21:54

## 2023-12-12 RX ADMIN — LIDOCAINE HYDROCHLORIDE,EPINEPHRINE BITARTRATE 3 ML: 20; .005 INJECTION, SOLUTION EPIDURAL; INFILTRATION; INTRACAUDAL; PERINEURAL at 22:13

## 2023-12-12 RX ADMIN — SODIUM CHLORIDE, POTASSIUM CHLORIDE, SODIUM LACTATE AND CALCIUM CHLORIDE 500 ML: 600; 310; 30; 20 INJECTION, SOLUTION INTRAVENOUS at 23:28

## 2023-12-12 RX ADMIN — LIDOCAINE HYDROCHLORIDE 160 MG: 20 INJECTION, SOLUTION EPIDURAL; INFILTRATION; INTRACAUDAL; PERINEURAL at 23:32

## 2023-12-12 RX ADMIN — LIDOCAINE HYDROCHLORIDE,EPINEPHRINE BITARTRATE 2 ML: 20; .005 INJECTION, SOLUTION EPIDURAL; INFILTRATION; INTRACAUDAL; PERINEURAL at 22:16

## 2023-12-12 RX ADMIN — Medication 12 ML/HR: at 22:16

## 2023-12-12 RX ADMIN — ONDANSETRON 4 MG: 2 INJECTION INTRAMUSCULAR; INTRAVENOUS at 22:54

## 2023-12-12 SDOH — HEALTH STABILITY: MENTAL HEALTH: CURRENT SMOKER: 0

## 2023-12-12 ASSESSMENT — PAIN SCALES - GENERAL
PAINLEVEL_OUTOF10: 0 - NO PAIN
PAINLEVEL_OUTOF10: 4

## 2023-12-12 NOTE — H&P
Obstetrical Admission History and Physical  Subjective     Chief Complaint: Contractions and Vaginal Bleeding      Veronica Cedeno is a 33 y.o.  at 38w1d. JEM: 2023, by 7w2 Ultrasound not c/w LMP. She has had prenatal care with Dr Diallo .    Patient has cramping/vaginal pressure every 10 minutes. Patient states that cramping has been on and off since Saturday. Patient also states that she has globe of mucusy/blood this afternoon which really prompted her/her partner to decide to come in to be seen. Good fetal movement. Denies any heavy vaginal bleeding.     Pregnancy notables:  -5.9% AIC, passed early and 30w testing  -anemia  Lab Results   Component Value Date    WBC 8.9 10/18/2023    HGB 10.4 (L) 10/18/2023    HCT 32.6 (L) 10/18/2023    MCV 85 10/18/2023     10/18/2023   -anxiety/depression  -OCD    Pregnancy Problems (from 05/10/23 to present)       Problem Noted Resolved    Back pain in pregnancy 2023 by Vanessa Diallo MD No    Priority:  Medium      Overview Signed 2023 12:16 PM by Vanessa Diallo MD     Chronic back issues from bulging disc. Will refer to PT for stretches, etc.         37 weeks gestation of pregnancy 10/15/2023 by Vanessa Diallo MD No    Priority:  Medium      Overview Addendum 2023 12:01 PM by Vanessa Diallo MD     NIPS returned risk reducing. History of four spontaneous pregnancy losses.   Desires 39 week induction due to rapid delivery.  EFW at 36 weeks 2992g 56%   Induction is  at 8 pm.                  Obstetrical History   OB History    Para Term  AB Living   7 2 2   4 2   SAB IAB Ectopic Multiple Live Births   4       2      # Outcome Date GA Lbr Art/2nd Weight Sex Delivery Anes PTL Lv   7 Current            6 Term 10/17/21 38w6d  3430 g F Vag-Spont None N DIAMOND   5 2021           4 2020           3 2016           2 Term 11 40w0d  3600 g F Vag-Forceps EPI N DIAMOND   1 2010     Incomplete S  Gen         Past Medical History  Past Medical History:   Diagnosis Date    Anemia     Depression         Past Surgical History   Past Surgical History:   Procedure Laterality Date    DILATION AND CURETTAGE OF UTERUS      TONSILLECTOMY  02/22/2016    Tonsillectomy With Adenoidectomy       Social History  Social History     Tobacco Use    Smoking status: Never    Smokeless tobacco: Never   Substance Use Topics    Alcohol use: Not Currently     Substance and Sexual Activity   Drug Use Never       Allergies  Oxycodone-acetaminophen     Medications  Medications Prior to Admission   Medication Sig Dispense Refill Last Dose    aspirin 81 mg EC tablet Take 2 tablets (162 mg) by mouth once daily.   12/11/2023    busPIRone (Buspar) 15 mg tablet Take 1 tablet (15 mg) by mouth once every 24 hours.   12/11/2023    FLUoxetine (PROzac) 40 mg capsule Take 1 capsule (40 mg) by mouth once daily.   12/11/2023    metoclopramide (Reglan) 5 mg tablet Take 1 tablet (5 mg) by mouth 4 times a day for 10 days. 40 tablet 0     -folic-omega-3-fish oil (Prenatal with DHA-Folic Acid) 400-32.5 mcg-mg tablet,chewable Chew 1 tablet once daily.   12/11/2023       Objective    Last Vitals  Temp Pulse Resp BP MAP O2 Sat   36.7 °C (98.1 °F) 84 20 130/79   99 %     Physical Examination  Physical Exam  Constitutional:       Appearance: Normal appearance.   HENT:      Head: Normocephalic.   Pulmonary:      Effort: Pulmonary effort is normal.   Abdominal:      Comments: gravid   Genitourinary:     General: Normal vulva.      Vagina: Normal.      Cervix: Normal.      Comments: Neg pooling  Minimal amount of white discharge present in vaginal vault  No bleeding visible   Skin:     General: Skin is warm and dry.   Neurological:      Mental Status: She is alert.   Psychiatric:         Mood and Affect: Mood normal.         Behavior: Behavior normal.         Thought Content: Thought content normal.         Judgment: Judgment normal.         Fetal  Monitoring      Baseline FHR: 145 per minute  Variability: moderate  Accelerations: yes  Decelerations: none  TOCO: irregular, every 3-7 minutes    Cervical Exam:  2/30/-3, posterior    Membrane status: intact, neg pooling, negative nitrazine  Cephalic by bedside US    Lab Review  Labs in chart were reviewed.    Assessment/Plan    Early Labor?  -offered patient option of discharge home vs recheck in 1-2hrs; patient strongly opts for recheck given they live +30 min away  -discussed with patient the glob of mucus might be mucus plug; reassurances given     Maternal well-being  -VSS  -encourage frequent position changes, getting up/out of bed   -GBS: neg 11/29/23     Fetal well-being  - NST reactive     Reassess in 1-2 hrs or prn ; report will be given to night team about her and need for recheck     MK Rivera-JEAN

## 2023-12-13 ENCOUNTER — APPOINTMENT (OUTPATIENT)
Dept: OBSTETRICS AND GYNECOLOGY | Facility: CLINIC | Age: 33
End: 2023-12-13
Payer: COMMERCIAL

## 2023-12-13 LAB
BASE EXCESS BLDCOA CALC-SCNC: ABNORMAL MMOL/L
BASE EXCESS BLDCOV CALC-SCNC: -2.9 MMOL/L (ref -8.1–-0.5)
BODY TEMPERATURE: 37 DEGREES CELSIUS
BODY TEMPERATURE: 37 DEGREES CELSIUS
HCO3 BLDCOA-SCNC: ABNORMAL MMOL/L
HCO3 BLDCOV-SCNC: 22.6 MMOL/L (ref 16–26)
INHALED O2 CONCENTRATION: 21 %
INHALED O2 CONCENTRATION: 21 %
OXYHGB MFR BLDCOA: 22.7 % (ref 94–98)
OXYHGB MFR BLDCOV: 65.1 % (ref 94–98)
PCO2 BLDCOA: ABNORMAL MM[HG]
PCO2 BLDCOV: 41 MM HG (ref 22–53)
PH BLDCOA: 7.17 PH (ref 7.08–7.39)
PH BLDCOV: 7.35 PH (ref 7.19–7.47)
PO2 BLDCOA: 26 MM HG (ref 5–31)
PO2 BLDCOV: 35 MM HG (ref 13–37)
SAO2 % BLDCOA: 23 % (ref 3–69)
SAO2 % BLDCOV: 66 % (ref 16–84)
T PALLIDUM AB SER QL: NONREACTIVE
TEST COMMENT: ABNORMAL
TEST COMMENT: ABNORMAL

## 2023-12-13 PROCEDURE — 4A0H74Z MEASUREMENT OF PRODUCTS OF CONCEPTION, CARDIAC ELECTRICAL ACTIVITY, VIA NATURAL OR ARTIFICIAL OPENING: ICD-10-PCS | Performed by: ADVANCED PRACTICE MIDWIFE

## 2023-12-13 PROCEDURE — 10907ZC DRAINAGE OF AMNIOTIC FLUID, THERAPEUTIC FROM PRODUCTS OF CONCEPTION, VIA NATURAL OR ARTIFICIAL OPENING: ICD-10-PCS | Performed by: ADVANCED PRACTICE MIDWIFE

## 2023-12-13 PROCEDURE — 59050 FETAL MONITOR W/REPORT: CPT

## 2023-12-13 PROCEDURE — 2500000001 HC RX 250 WO HCPCS SELF ADMINISTERED DRUGS (ALT 637 FOR MEDICARE OP): Performed by: ADVANCED PRACTICE MIDWIFE

## 2023-12-13 PROCEDURE — 7210000002 HC LABOR PER HOUR

## 2023-12-13 PROCEDURE — 3E033VJ INTRODUCTION OF OTHER HORMONE INTO PERIPHERAL VEIN, PERCUTANEOUS APPROACH: ICD-10-PCS | Performed by: ADVANCED PRACTICE MIDWIFE

## 2023-12-13 PROCEDURE — 7100000016 HC LABOR RECOVERY PER HOUR

## 2023-12-13 PROCEDURE — 59409 OBSTETRICAL CARE: CPT | Performed by: ADVANCED PRACTICE MIDWIFE

## 2023-12-13 PROCEDURE — 82805 BLOOD GASES W/O2 SATURATION: CPT | Performed by: ADVANCED PRACTICE MIDWIFE

## 2023-12-13 PROCEDURE — 1100000001 HC PRIVATE ROOM DAILY

## 2023-12-13 PROCEDURE — 2500000004 HC RX 250 GENERAL PHARMACY W/ HCPCS (ALT 636 FOR OP/ED): Performed by: ADVANCED PRACTICE MIDWIFE

## 2023-12-13 RX ORDER — DIPHENHYDRAMINE HYDROCHLORIDE 50 MG/ML
25 INJECTION INTRAMUSCULAR; INTRAVENOUS EVERY 6 HOURS PRN
Status: DISCONTINUED | OUTPATIENT
Start: 2023-12-13 | End: 2023-12-14 | Stop reason: HOSPADM

## 2023-12-13 RX ORDER — SIMETHICONE 80 MG
80 TABLET,CHEWABLE ORAL 4 TIMES DAILY PRN
Status: DISCONTINUED | OUTPATIENT
Start: 2023-12-13 | End: 2023-12-14 | Stop reason: HOSPADM

## 2023-12-13 RX ORDER — METHYLERGONOVINE MALEATE 0.2 MG/ML
0.2 INJECTION INTRAVENOUS ONCE AS NEEDED
Status: DISCONTINUED | OUTPATIENT
Start: 2023-12-13 | End: 2023-12-14 | Stop reason: HOSPADM

## 2023-12-13 RX ORDER — LOPERAMIDE HYDROCHLORIDE 2 MG/1
4 CAPSULE ORAL EVERY 2 HOUR PRN
Status: DISCONTINUED | OUTPATIENT
Start: 2023-12-13 | End: 2023-12-14 | Stop reason: HOSPADM

## 2023-12-13 RX ORDER — POLYETHYLENE GLYCOL 3350 17 G/17G
17 POWDER, FOR SOLUTION ORAL 2 TIMES DAILY PRN
Status: DISCONTINUED | OUTPATIENT
Start: 2023-12-13 | End: 2023-12-14 | Stop reason: HOSPADM

## 2023-12-13 RX ORDER — OXYTOCIN 10 [USP'U]/ML
10 INJECTION, SOLUTION INTRAMUSCULAR; INTRAVENOUS ONCE AS NEEDED
Status: DISCONTINUED | OUTPATIENT
Start: 2023-12-13 | End: 2023-12-14 | Stop reason: HOSPADM

## 2023-12-13 RX ORDER — LIDOCAINE 560 MG/1
1 PATCH PERCUTANEOUS; TOPICAL; TRANSDERMAL
Status: DISCONTINUED | OUTPATIENT
Start: 2023-12-13 | End: 2023-12-14 | Stop reason: HOSPADM

## 2023-12-13 RX ORDER — FERROUS SULFATE 325(65) MG
65 TABLET ORAL 2 TIMES DAILY
Status: DISCONTINUED | OUTPATIENT
Start: 2023-12-13 | End: 2023-12-14 | Stop reason: HOSPADM

## 2023-12-13 RX ORDER — NIFEDIPINE 10 MG/1
10 CAPSULE ORAL ONCE AS NEEDED
Status: DISCONTINUED | OUTPATIENT
Start: 2023-12-13 | End: 2023-12-14 | Stop reason: HOSPADM

## 2023-12-13 RX ORDER — ACETAMINOPHEN 325 MG/1
975 TABLET ORAL EVERY 6 HOURS PRN
Status: DISCONTINUED | OUTPATIENT
Start: 2023-12-13 | End: 2023-12-14 | Stop reason: HOSPADM

## 2023-12-13 RX ORDER — DIPHENHYDRAMINE HCL 25 MG
25 CAPSULE ORAL EVERY 6 HOURS PRN
Status: DISCONTINUED | OUTPATIENT
Start: 2023-12-13 | End: 2023-12-14 | Stop reason: HOSPADM

## 2023-12-13 RX ORDER — HYDRALAZINE HYDROCHLORIDE 20 MG/ML
5 INJECTION INTRAMUSCULAR; INTRAVENOUS ONCE AS NEEDED
Status: DISCONTINUED | OUTPATIENT
Start: 2023-12-13 | End: 2023-12-14 | Stop reason: HOSPADM

## 2023-12-13 RX ORDER — ONDANSETRON 4 MG/1
4 TABLET, FILM COATED ORAL EVERY 6 HOURS PRN
Status: DISCONTINUED | OUTPATIENT
Start: 2023-12-13 | End: 2023-12-14 | Stop reason: HOSPADM

## 2023-12-13 RX ORDER — TRANEXAMIC ACID 100 MG/ML
1000 INJECTION, SOLUTION INTRAVENOUS ONCE AS NEEDED
Status: DISCONTINUED | OUTPATIENT
Start: 2023-12-13 | End: 2023-12-14 | Stop reason: HOSPADM

## 2023-12-13 RX ORDER — MISOPROSTOL 200 UG/1
800 TABLET ORAL ONCE AS NEEDED
Status: DISCONTINUED | OUTPATIENT
Start: 2023-12-13 | End: 2023-12-14 | Stop reason: HOSPADM

## 2023-12-13 RX ORDER — ONDANSETRON HYDROCHLORIDE 2 MG/ML
4 INJECTION, SOLUTION INTRAVENOUS EVERY 6 HOURS PRN
Status: DISCONTINUED | OUTPATIENT
Start: 2023-12-13 | End: 2023-12-14 | Stop reason: HOSPADM

## 2023-12-13 RX ORDER — BISACODYL 10 MG/1
10 SUPPOSITORY RECTAL DAILY PRN
Status: DISCONTINUED | OUTPATIENT
Start: 2023-12-13 | End: 2023-12-14 | Stop reason: HOSPADM

## 2023-12-13 RX ORDER — ADHESIVE BANDAGE
10 BANDAGE TOPICAL
Status: DISCONTINUED | OUTPATIENT
Start: 2023-12-13 | End: 2023-12-14 | Stop reason: HOSPADM

## 2023-12-13 RX ORDER — OXYTOCIN/0.9 % SODIUM CHLORIDE 30/500 ML
60 PLASTIC BAG, INJECTION (ML) INTRAVENOUS ONCE AS NEEDED
Status: DISCONTINUED | OUTPATIENT
Start: 2023-12-13 | End: 2023-12-14 | Stop reason: HOSPADM

## 2023-12-13 RX ORDER — CARBOPROST TROMETHAMINE 250 UG/ML
250 INJECTION, SOLUTION INTRAMUSCULAR ONCE AS NEEDED
Status: DISCONTINUED | OUTPATIENT
Start: 2023-12-13 | End: 2023-12-14 | Stop reason: HOSPADM

## 2023-12-13 RX ORDER — LABETALOL HYDROCHLORIDE 5 MG/ML
20 INJECTION, SOLUTION INTRAVENOUS ONCE AS NEEDED
Status: DISCONTINUED | OUTPATIENT
Start: 2023-12-13 | End: 2023-12-14 | Stop reason: HOSPADM

## 2023-12-13 RX ORDER — IBUPROFEN 600 MG/1
600 TABLET ORAL EVERY 6 HOURS
Status: DISCONTINUED | OUTPATIENT
Start: 2023-12-13 | End: 2023-12-14 | Stop reason: HOSPADM

## 2023-12-13 RX ADMIN — IBUPROFEN 600 MG: 600 TABLET ORAL at 16:22

## 2023-12-13 RX ADMIN — ACETAMINOPHEN 975 MG: 325 TABLET ORAL at 03:55

## 2023-12-13 RX ADMIN — FERROUS SULFATE TAB 325 MG (65 MG ELEMENTAL FE) 1 TABLET: 325 (65 FE) TAB at 20:32

## 2023-12-13 RX ADMIN — BUSPIRONE HYDROCHLORIDE 15 MG: 5 TABLET ORAL at 20:32

## 2023-12-13 RX ADMIN — Medication 60 MILLI-UNITS/MIN: at 01:38

## 2023-12-13 RX ADMIN — FLUOXETINE 40 MG: 20 CAPSULE ORAL at 20:32

## 2023-12-13 RX ADMIN — ACETAMINOPHEN 975 MG: 325 TABLET ORAL at 09:22

## 2023-12-13 RX ADMIN — IBUPROFEN 600 MG: 600 TABLET ORAL at 09:22

## 2023-12-13 RX ADMIN — ACETAMINOPHEN 975 MG: 325 TABLET ORAL at 16:22

## 2023-12-13 RX ADMIN — IBUPROFEN 600 MG: 600 TABLET ORAL at 03:55

## 2023-12-13 RX ADMIN — IBUPROFEN 600 MG: 600 TABLET ORAL at 22:01

## 2023-12-13 ASSESSMENT — PAIN SCALES - GENERAL
PAINLEVEL_OUTOF10: 0 - NO PAIN
PAINLEVEL_OUTOF10: 1
PAINLEVEL_OUTOF10: 5 - MODERATE PAIN
PAIN_LEVEL: 2
PAINLEVEL_OUTOF10: 0 - NO PAIN

## 2023-12-13 NOTE — ANESTHESIA PREPROCEDURE EVALUATION
Patient: Veronica Cedeno      Evaluation Method: In-person visit     38wk1d in labor, intact. History of  x 2 with epidurals. Forceps delivery with 1st pregnancy. H/o chronic LBP, herniated disc. Anemia, anxiety. Desires epidural. Will accept blood products.    Procedure Information    Date: 23  Procedure: Labor Analgesia         Relevant Problems   Neuro/Psych   (+) Anxiety and depression   (+) Major depressive disorder, single episode, moderate with anxious distress (CMS/HCC)   (+) OCD (obsessive compulsive disorder)   (+) Panic disorder      Hematology   (+) Anemia, antepartum, third trimester      Musculoskeletal   (+) Lumbosacral disc herniation       Clinical information reviewed:    Allergies  Meds  Problems  Med Hx  Surg Hx   Fam Hx          NPO Detail:  No data recorded     OB/Gyn Evaluation    Present Pregnancy    Patient is pregnant now.   Obstetric History                Physical Exam    Airway  Mallampati: II     Cardiovascular - normal exam  Rhythm: regular  Rate: normal     Dental - normal exam     Pulmonary - normal exam  Breath sounds clear to auscultation     Abdominal - normal exam             Anesthesia Plan    ASA 3     epidural     The patient is not a current smoker.    Anesthetic plan and risks discussed with patient.  Use of blood products discussed with patient who consented to blood products.    Plan discussed with CRNA.

## 2023-12-13 NOTE — LACTATION NOTE
Lactation Consultant Note  Lactation Consultation  Reason for Consult: Initial assessment  Consultant Name: Janet MONTANA    Maternal Information  Has mother  before?: Yes  How long did the mother previously breastfeed?: 16 months  Infant to breast within first 2 hours of birth?: Yes  Exclusive Pump and Bottle Feed: No    Maternal Assessment  Breast Assessment:  (deferred)    Infant Assessment       Feeding Assessment  Nutrition Source: Breastmilk, Formula (per mother’s request)  Feeding Method: Nursing at the breast    LATCH TOOL       Breast Pump       Other OB Lactation Tools       Patient Follow-up  Inpatient Lactation Follow-up Needed : Yes (Mom aware to call for assistnce)    Other OB Lactation Documentation       Recommendations/Summary  I did not observe a feed this visit. Mom is both breast and formula feeding per her preference. We reviewed the impact bottle feeding formula may have on milk supply and latch. Mom is aware that it is beneficial to latch baby for feeds if she desires to breast feed. Mom also aware that she may pump if she feels that she will continue to formula feed and she would like to protect and build her milk supply. Reviewed with patient milk supply patterns and  feeding patterns in the fist and second 24 HOL. Breastfeeding education reviewed and questions answered. Mom aware of lactation support and asked to call out for feed assistance and with questions as needed.

## 2023-12-13 NOTE — CARE PLAN
The patient's goals for the shift include transition to postpartum and return to pre-pregnancy state    The clinical goals for the shift include pain control, wdl assessments and remain safe until end of shift

## 2023-12-13 NOTE — L&D DELIVERY NOTE
Delivery Note: Vaginal     Review the Delivery Report for details.     Veronica Cedeno is a 33 y.o., now  with an estimated date of delivery of 23, who delivered at 38w2d gestation.     Membrane rupture occurred at 12:18 AM  on 2023 , and the amniotic fluid was clear . With epidural  anesthesia, the patient's labor was spontaneous Labor was augmented with AROM   Delivery Procedure     FHR baseline 120s-130s, moderate variability, with small variables leading up to exam. One late decel followed by one variable. Patient was found to be complete +2 station FHR trending down to a baseline of 90s. Patient instructed to push quickly moved the fetal head from +2 to a full crown- Baby emerged after one effective push.    Labor progressed, the fetal head descended, and was carefully delivered from a occiput  anterior  position. With additional maternal expulsive effort, the shoulders and remainder of the fetus were atraumatically delivered.A controlled vaginal delivery was completed at 2:09 AM  on 2023 . The baby was a living , 3335 g , female infant, with APGAR scores of 6 at 1 min / 8 at 5 min, delivered by Vaginal, Spontaneous  from an occiput  anterior  position.  loop(s) of nuchal cord was identified and reduced. After an appropriate delay, the cord was doubly clamped and cut. The umbilical cord was found to have 3 vessels . Skin to Skin was initiated at 2:30 AM . The placenta was spontaneous and appeared intact . Pitocin was given immediately after delivery.  The uterine cavity was not examined. Fundal massage was performed, and the fundus was firm. Episiotomy was not done.Perineum, vagina, labia, and cervix were inspected and found to have no lacerations  that were not repaired in the usual fashion. Hemostasis was confirmed on final inspection. Final sponge, needle, and instrument counts were reported and correct.   QBL  Delivery Complications:  None        The patient tolerated the delivery  well and is in good and stable condition.

## 2023-12-13 NOTE — CARE PLAN
Problem: Vaginal Birth or  Section  Goal: Fetal and maternal status remain reassuring during the birth process  Outcome: Progressing  Goal: Tolerate CRB for IOL placement maintenance until dislodgement/removal 12hrs after placement  Outcome: Progressing  Goal: Prevention of malpresentation/labor dystocia through delivery  Outcome: Progressing  Goal: Demonstrates labor coping techniques through delivery  Outcome: Progressing  Goal: Minimal s/sx of HDP and BP<160/110  Outcome: Progressing  Goal: No s/sx of infection through recovery  Outcome: Progressing  Goal: No s/sx of hemorrhage through recovery  Outcome: Progressing     Problem: Postpartum  Goal: Experiences normal postpartum course  Outcome: Progressing  Goal: Appropriate maternal -  bonding  Outcome: Progressing  Goal: Establish and maintain infant feeding pattern for adequate nutrition  Outcome: Progressing  Goal: Incisions, wounds, or drain sites healing without S/S of infection  Outcome: Progressing  Goal: No s/sx infection  Outcome: Progressing  Goal: No s/sx of hemorrhage  Outcome: Progressing  Goal: Minimal s/sx of HDP and BP<160/110  Outcome: Progressing     Problem: Anemia in pregnancy  Goal: Tolerates treatment for anemia  Outcome: Progressing     Problem: Hypertensive Disorder of Pregnancy (HDP)  Goal: Minimal s/sx of HDP and BP<160/110  Outcome: Progressing  Goal: Adequate urine output (0.5 ml/kg/hr)  Outcome: Progressing     Problem:  Labor/Prolonged Premature Rupture of Membranes  Goal: Fewer then 4-6 ct per hour  Outcome: Progressing  Goal: No s/sx of IAI  Outcome: Progressing     Problem: Nausea/Vomiting  Goal: Adequate urine output (0.5 ml/kg/hr)  Outcome: Progressing  Goal: Free from nausea/vomiting  Outcome: Progressing  Goal: Tolerates prescribed diet  Outcome: Progressing  Goal: Weight maintenance or gain  Outcome: Progressing  Goal: Achieve/maintain normal electrolyte level  Outcome: Progressing     Problem:  Infection  Goal: Fever/diaphoresis will improve to <38.0 C  Outcome: Progressing  Goal: Wound will have less exudate and warmth  Outcome: Progressing  Goal: Improvement in s/sx of infection  Outcome: Progressing     Problem: UH VAGINAL BLEEDING/HEMORRHAGE AP  Goal: Fewer then 4-6 ct per hour  Outcome: Progressing  Goal: No s/sx of hemorrhage  Outcome: Progressing     Problem: Postpartum hemorrhage  Goal: Hemodynamic stability and limit blood loss  Outcome: Progressing     Problem: Pain - Adult  Goal: Verbalizes/displays adequate comfort level or baseline comfort level  Outcome: Progressing     Problem: Safety - Adult  Goal: Free from fall injury  Outcome: Progressing     Problem: Discharge Planning  Goal: Discharge to home or other facility with appropriate resources  Outcome: Progressing     Problem: Chronic Conditions and Co-morbidities  Goal: Patient's chronic conditions and co-morbidity symptoms are monitored and maintained or improved  Outcome: Progressing

## 2023-12-13 NOTE — PROGRESS NOTES
Intrapartum Progress Note    Assessment/Plan   Veronica Cedeno is a 33 y.o.  at 38w2d. JEM: 2023, by Ultrasound.     Active labor    Cat II tracing  Dr. Tamez to the bedside  Patient flipped to maternal left and right   AROM for clear fluid  FSE applied FHR returned to normal baseline    Principal Problem:    Uterine contractions    Pregnancy Problems (from 05/10/23 to present)       Problem Noted Resolved    Uterine contractions 2023 by BARTOLO Cisneros No    Priority:  Medium      37 weeks gestation of pregnancy 10/15/2023 by Vanessa Diallo MD No    Priority:  Medium      Overview Addendum 2023 12:01 PM by Vanessa Diallo MD     NIPS returned risk reducing. History of four spontaneous pregnancy losses.   Desires 39 week induction due to rapid delivery.  EFW at 36 weeks 2992g 56%   Induction is  at 8 pm.         Acute left-sided low back pain with left-sided sciatica 2019 by Vanessa Diallo MD No    Priority:  Medium      Overview Addendum 2023  9:28 PM by BARTOLO Cisneros     Chronic back issues from bulging disc. Will refer to PT for stretches, etc.                 Subjective   Patient having non-recurrent lates, time for a straight cath, -while catheterizing FHR began to drop- patient rotated side to side. AROM and FSE applied. Called Dr Tamez to the bedside.    Objective   Last Vitals:  Temp Pulse Resp BP MAP Pulse Ox   36.6 °C (97.9 °F) 74 16 99/50   99 %     Vitals Min/Max Last 24 Hours:  Temp  Min: 36.6 °C (97.9 °F)  Max: 36.7 °C (98.1 °F)  Pulse  Min: 56  Max: 93  Resp  Min: 16  Max: 20  BP  Min: 99/50  Max: 136/64    Intake/Output:  No intake or output data in the 24 hours ending 23 0040    Physical Examination:  Cervix: 6//-2 AROM CLEAR   FHR is 152  , with Accelerations, Variable decelerations, Late decelerations, Prolonged decelerations, and a Category II tracing.  See labor progress flow sheet for more  detailed explanation of declerations  [place] reading:  regular q2-4 min apart   The fetus is in a vertex presentation, determined by ultrasound    Lab Review:  Lab Results   Component Value Date    ABO O 12/12/2023    LABRH POS 12/12/2023    ABSCRN NEG 12/12/2023

## 2023-12-13 NOTE — ANESTHESIA POSTPROCEDURE EVALUATION
Patient: Veronica Cedeno    Procedure Summary       Date: 12/12/23 Room / Location:     Anesthesia Start: 2200 Anesthesia Stop: 12/13/23 0209    Procedure: Labor Analgesia Diagnosis:     Scheduled Providers:  Responsible Provider: SARY Paz    Anesthesia Type: epidural ASA Status: 3            Anesthesia Type: epidural    Vitals Value Taken Time   /86 12/13/23 0614   Temp 36.8 12/13/23 0614   Pulse 60 12/13/23 0614   Resp 16 12/13/23 0614   SpO2 98 12/13/23 0614       Anesthesia Post Evaluation    Patient location during evaluation: bedside  Patient participation: complete - patient participated  Level of consciousness: awake and alert  Pain score: 2  Pain management: adequate  Airway patency: patent  Cardiovascular status: acceptable  Respiratory status: acceptable  Hydration status: acceptable  Postoperative Nausea and Vomiting: none        No notable events documented.

## 2023-12-13 NOTE — ANESTHESIA PROCEDURE NOTES
Epidural Block    Start time: 12/12/2023 10:00 PM  End time: 12/12/2023 10:21 PM  Reason for block: labor analgesia  Staffing  Performed: CRNA   Authorized by: SARY Paz    Performed by: SARY Paz    Preanesthetic Checklist  Completed: patient identified, IV checked, risks and benefits discussed, surgical consent, pre-op evaluation, timeout performed and sterile techniques followed  Block Timeout  RN/Licensed healthcare professional reads aloud to the Anesthesia provider and entire team: Patient identity, procedure with side and site, patient position, and as applicable the availability of implants/special equipment/special requirements.  Patient on coagulant treatment: no  Timeout performed at: 12/12/2023 10:04 PM  Block Placement  Patient position: sitting  Prep: ChloraPrep  Sterility prep: cap, drape, gloves, hand and mask  Sedation level: no sedation  Patient monitoring: blood pressure and continuous pulse oximetry  Approach: midline  Local numbing: lidocaine 1% to skin and subcutaneous tissues  Vertebral space: lumbar  Epidural  Loss of resistance technique: saline  Guidance: landmark technique        Needle  Needle type: Tuohy   Needle gauge: 17  Needle length: 9 cm  Needle insertion depth: 4 cm  Catheter type: end hole  Catheter size: 18 G  Catheter at skin depth: 10 cm  Catheter securement method: clear occlusive dressing    Test dose: lidocaine 1.5% with epinephrine 1-to-200,000  Test dose given at 12/12/2023 10:13 PM  Test dose: lidocaine 1.5% with epinephrine 1-to-200,000  Test dose result: no positive test dose    PCEA  Medication concentration used: 0.2% Ropivacaine with 2 mcg/mL Fentanyl  Dose (mL): 5  Lockout (minutes): 15  1-Hour Limit (boluses/hr): 4  Basal Rate: 12        Assessment  Sensory level: T10 bilateral  Block outcome: pain improved  Number of attempts: 1  Events: no positive test dose  Procedure assessment: patient tolerated procedure well with no  immediate complications  Additional Notes  1% lido localization. Neg heme, neg csf, neg parasthesia

## 2023-12-13 NOTE — H&P
Obstetrical Admission History and Physical     Veronica Cedeno is a 33 y.o. . Contractions and making cervical change     Chief Complaint: Contractions and Vaginal Bleeding    Patient was seen in triage. Reactive NST changed from 230/-3 --> 3.5/50/-3   Was moved to a labor room just recently upon recheck was 4-5/60/-3 and getting progressively more uncomfortable    Contractions have gone from q7-10 min apart to now q3-5 min apart     Assessment/Plan    32yo  38w1d    Labor  Admit to L&D  Routine orders  Desires epidural- went so fast last time was unable to receive     Anemia  Hgb starting 9.8  T&C x1  Dr. Tamez aware    Glucose intolerance in pregnancy   A1c 5.9--> normal 3hr  1hr 191 --> normal 3hr    Anticipate SVB  Will update Dr. Tamez as needed for changes in maternal/fetal status  Terri TERRAZAS CNM      Principal Problem:    Uterine contractions      Pregnancy Problems (from 05/10/23 to present)       Problem Noted Resolved    Uterine contractions 2023 by BARTOLO Cisneros No    Priority:  Medium      37 weeks gestation of pregnancy 10/15/2023 by Vanessa Diallo MD No    Priority:  Medium      Overview Addendum 2023 12:01 PM by Vanessa Diallo MD     NIPS returned risk reducing. History of four spontaneous pregnancy losses.   Desires 39 week induction due to rapid delivery.  EFW at 36 weeks 2992g 56%   Induction is  at 8 pm.         Acute left-sided low back pain with left-sided sciatica 2019 by Vanessa Diallo MD No    Priority:  Medium      Overview Addendum 2023  9:28 PM by BARTOLO Cisneros     Chronic back issues from bulging disc. Will refer to PT for stretches, etc.               Subjective   Veronica is here complaining of q7-10 min contractions and vaginal bleeding. Good fetal movement. C/O bloody show., Denies leaking of fluid.      See triage narrative from above      Obstetrical History   OB History     Para Term  AB Living   7 2 2   4 2   SAB IAB Ectopic Multiple Live Births   4       2      # Outcome Date GA Lbr Art/2nd Weight Sex Delivery Anes PTL Lv   7 Current            6 Term 10/17/21 38w6d  3430 g F Vag-Spont None N DIAMOND   5 SAB 2021           4 2020           3 2016           2 Term 11 40w0d  3600 g F Vag-Forceps EPI N DIAMOND   1 2010     Incomplete S Gen         Past Medical History  Past Medical History:   Diagnosis Date    Anemia     Depression         Past Surgical History   Past Surgical History:   Procedure Laterality Date    DILATION AND CURETTAGE OF UTERUS      TONSILLECTOMY  2016    Tonsillectomy With Adenoidectomy       Social History  Social History     Tobacco Use    Smoking status: Never    Smokeless tobacco: Never   Substance Use Topics    Alcohol use: Not Currently     Substance and Sexual Activity   Drug Use Never       Allergies  Oxycodone-acetaminophen     Medications  Medications Prior to Admission   Medication Sig Dispense Refill Last Dose    aspirin 81 mg EC tablet Take 2 tablets (162 mg) by mouth once daily.   2023    busPIRone (Buspar) 15 mg tablet Take 1 tablet (15 mg) by mouth once every 24 hours.   2023    FLUoxetine (PROzac) 40 mg capsule Take 1 capsule (40 mg) by mouth once daily.   2023    metoclopramide (Reglan) 5 mg tablet Take 1 tablet (5 mg) by mouth 4 times a day for 10 days. 40 tablet 0     -folic-omega-3-fish oil (Prenatal with DHA-Folic Acid) 400-32.5 mcg-mg tablet,chewable Chew 1 tablet once daily.   2023       Objective    Last Vitals  Temp Pulse Resp BP MAP O2 Sat   36.6 °C (97.9 °F) 75 16 136/71   98 %     Physical Examination  GENERAL: Examination reveals a well developed, well nourished, gravid female in no acute distress. She is alert and cooperative.  LUNGS: clear to auscultation bilaterally  HEART: regular rate and rhythm, S1, S2 normal, no murmur, click, rub or gallop  ABDOMEN: soft,  "gravid, nontender, nondistended, no abnormal masses, no epigastric pain  FHR is  , with Accelerations, Late decelerations, Variable decelerations, and a Category II tracing.    [place] reading:    The fetus is in a vertex presentation, determined by ultrasound done by day shift CNM  Current Estimated Fetal Weight 5mn39cy established by ultrasound   CERVIX: 4 cm dilated, 60 % effaced, -3 station; MEMBRANES are Intact  EXTREMITIES: no redness or tenderness in the calves or thighs, no edema  PSYCHOLOGICAL: awake and alert; oriented to person, place, and time    Lab Review  No results found for: \"ABO\", \"LABRH\", \"ABSCRN\"  Lab Results   Component Value Date    WBC 11.9 (H) 2023    HGB 9.8 (L) 2023    HCT 30.3 (L) 2023     2023     Obstetrical Admission History and Physical  Subjective     Chief Complaint: Contractions and Vaginal Bleeding      Veronica Cedeno is a 33 y.o.  at 38w1d. JEM: 2023, by 7w2 Ultrasound not c/w LMP. She has had prenatal care with Dr Diallo .    Patient has cramping/vaginal pressure every 10 minutes. Patient states that cramping has been on and off since Saturday. Patient also states that she has globe of mucusy/blood this afternoon which really prompted her/her partner to decide to come in to be seen. Good fetal movement. Denies any heavy vaginal bleeding.     Pregnancy notables:  -5.9% AIC, passed early and 30w testing  -anemia  Lab Results   Component Value Date    WBC 11.9 (H) 2023    HGB 9.8 (L) 2023    HCT 30.3 (L) 2023    MCV 79 (L) 2023     2023   -anxiety/depression  -OCD    Pregnancy Problems (from 05/10/23 to present)       Problem Noted Resolved    Back pain in pregnancy 2023 by Vanessa Diallo MD No    Priority:  Medium      Overview Signed 2023 12:16 PM by Vanessa Diallo MD     Chronic back issues from bulging disc. Will refer to PT for stretches, etc.         37 weeks gestation of " pregnancy 10/15/2023 by Vanessa Diallo MD No    Priority:  Medium      Overview Addendum 2023 12:01 PM by Vanessa Diallo MD     NIPS returned risk reducing. History of four spontaneous pregnancy losses.   Desires 39 week induction due to rapid delivery.  EFW at 36 weeks 2992g 56%   Induction is  at 8 pm.                  Obstetrical History   OB History    Para Term  AB Living   7 2 2   4 2   SAB IAB Ectopic Multiple Live Births   4       2      # Outcome Date GA Lbr Art/2nd Weight Sex Delivery Anes PTL Lv   7 Current            6 Term 10/17/21 38w6d  3430 g F Vag-Spont None N DIAMOND   5 SAB 2021           4 2020           3 2016           2 Term 11 40w0d  3600 g F Vag-Forceps EPI N DIAMOND   1 2010     Incomplete S Gen         Past Medical History  Past Medical History:   Diagnosis Date    Anemia     Depression         Past Surgical History   Past Surgical History:   Procedure Laterality Date    DILATION AND CURETTAGE OF UTERUS      TONSILLECTOMY  2016    Tonsillectomy With Adenoidectomy       Social History  Social History     Tobacco Use    Smoking status: Never    Smokeless tobacco: Never   Substance Use Topics    Alcohol use: Not Currently     Substance and Sexual Activity   Drug Use Never       Allergies  Oxycodone-acetaminophen     Medications  Medications Prior to Admission   Medication Sig Dispense Refill Last Dose    aspirin 81 mg EC tablet Take 2 tablets (162 mg) by mouth once daily.   2023    busPIRone (Buspar) 15 mg tablet Take 1 tablet (15 mg) by mouth once every 24 hours.   2023    FLUoxetine (PROzac) 40 mg capsule Take 1 capsule (40 mg) by mouth once daily.   2023    metoclopramide (Reglan) 5 mg tablet Take 1 tablet (5 mg) by mouth 4 times a day for 10 days. 40 tablet 0     -folic-omega-3-fish oil (Prenatal with DHA-Folic Acid) 400-32.5 mcg-mg tablet,chewable Chew 1 tablet once daily.   2023       Objective    Last  Vitals  Temp Pulse Resp BP MAP O2 Sat   36.6 °C (97.9 °F) 75 16 136/71   98 %     Physical Examination  Physical Exam  Constitutional:       Appearance: Normal appearance.   HENT:      Head: Normocephalic.   Pulmonary:      Effort: Pulmonary effort is normal.   Abdominal:      Comments: gravid   Genitourinary:     General: Normal vulva.      Vagina: Normal.      Cervix: Normal.      Comments: Neg pooling  Minimal amount of white discharge present in vaginal vault  No bleeding visible   Skin:     General: Skin is warm and dry.   Neurological:      Mental Status: She is alert.   Psychiatric:         Mood and Affect: Mood normal.         Behavior: Behavior normal.         Thought Content: Thought content normal.         Judgment: Judgment normal.         Fetal Monitoring      Baseline FHR: 145 per minute  Variability: moderate  Accelerations: yes  Decelerations: none  TOCO: irregular, every 3-7 minutes    Cervical Exam:  2/30/-3, posterior    Membrane status: intact, neg pooling, negative nitrazine  Cephalic by bedside US    Lab Review  Labs in chart were reviewed.    Assessment/Plan    Early Labor?  -offered patient option of discharge home vs recheck in 1-2hrs; patient strongly opts for recheck given they live +30 min away  -discussed with patient the glob of mucus might be mucus plug; reassurances given     Maternal well-being  -VSS  -encourage frequent position changes, getting up/out of bed   -GBS: neg 11/29/23     Fetal well-being  - NST reactive     Reassess in 1-2 hrs or prn ; report will be given to night team about her and need for recheck     MK Cisneros-JEAN

## 2023-12-13 NOTE — PROGRESS NOTES
Intrapartum Progress Note    Assessment/Plan   Veronica Cedeno is a 33 y.o.  at 38w1d. JEM: 2023, by Ultrasound.     Labor   Cat II tracing- deferred AROM and FSE at this time per patient request. Tracing FHR accurately at this time will continue to monitor closely- if more decels arise will AROM and place FSE - otherwise will AROM in 1hr at time of straight cath   -patient agreeable, risks, benefits alternatives reviewed    IV fluid bolus 500cc     Patient rotated to maternal left     Discussed FHR tracing with Dr. Joi MCCLELLAN    Principal Problem:    Uterine contractions    Pregnancy Problems (from 05/10/23 to present)       Problem Noted Resolved    Uterine contractions 2023 by BARTOLO Cisneros No    Priority:  Medium      37 weeks gestation of pregnancy 10/15/2023 by Vanessa Diallo MD No    Priority:  Medium      Overview Addendum 2023 12:01 PM by Vanessa Diallo MD     NIPS returned risk reducing. History of four spontaneous pregnancy losses.   Desires 39 week induction due to rapid delivery.  EFW at 36 weeks 2992g 56%   Induction is  at 8 pm.         Acute left-sided low back pain with left-sided sciatica 2019 by Vanessa Diallo MD No    Priority:  Medium      Overview Addendum 2023  9:28 PM by BARTOLO Cisneros     Chronic back issues from bulging disc. Will refer to PT for stretches, etc.                 Subjective   FHR decelerations observed on tracing.     Objective   Last Vitals:  Temp Pulse Resp BP MAP Pulse Ox   36.6 °C (97.9 °F) 66 17 102/55   97 %     Vitals Min/Max Last 24 Hours:  Temp  Min: 36.6 °C (97.9 °F)  Max: 36.7 °C (98.1 °F)  Pulse  Min: 65  Max: 93  Resp  Min: 16  Max: 20  BP  Min: 102/55  Max: 136/64    Intake/Output:  No intake or output data in the 24 hours ending 23 0371    Physical Examination:  GENERAL: Examination reveals a well developed, well nourished, gravid female in no acute distress. She is  alert and cooperative.  FHR is 141 , with Variable decelerations, Late decelerations, Prolonged decelerations, and a Category II tracing.  Decels resolved with patient on left side, IV fluid bolus initiated   [place] reading:  regular q2-3 min apart lasting 70-90sec   CERVIX: 4 cm dilated, 70 % effaced, -3 station; MEMBRANES are Intact    Lab Review:  Labs in chart were reviewed.  Lab Results   Component Value Date    ABO O 12/12/2023    LABRH POS 12/12/2023    ABSCRN NEG 12/12/2023

## 2023-12-14 VITALS
HEIGHT: 71 IN | WEIGHT: 221.89 LBS | TEMPERATURE: 99 F | DIASTOLIC BLOOD PRESSURE: 68 MMHG | SYSTOLIC BLOOD PRESSURE: 111 MMHG | OXYGEN SATURATION: 98 % | BODY MASS INDEX: 31.06 KG/M2 | RESPIRATION RATE: 18 BRPM | HEART RATE: 85 BPM

## 2023-12-14 PROBLEM — M25.532 LEFT WRIST PAIN: Status: RESOLVED | Noted: 2023-09-15 | Resolved: 2023-12-14

## 2023-12-14 PROBLEM — O47.9 UTERINE CONTRACTIONS (HHS-HCC): Status: RESOLVED | Noted: 2023-12-12 | Resolved: 2023-12-14

## 2023-12-14 PROBLEM — F32.1 MAJOR DEPRESSIVE DISORDER, SINGLE EPISODE, MODERATE WITH ANXIOUS DISTRESS (MULTI): Status: RESOLVED | Noted: 2023-09-15 | Resolved: 2023-12-14

## 2023-12-14 PROBLEM — M54.42 ACUTE LEFT-SIDED LOW BACK PAIN WITH LEFT-SIDED SCIATICA: Status: RESOLVED | Noted: 2019-07-02 | Resolved: 2023-12-14

## 2023-12-14 PROBLEM — O99.810 GLUCOSE INTOLERANCE OF PREGNANCY (HHS-HCC): Status: RESOLVED | Noted: 2023-09-15 | Resolved: 2023-12-14

## 2023-12-14 PROBLEM — R31.9 HEMATURIA: Status: RESOLVED | Noted: 2023-09-15 | Resolved: 2023-12-14

## 2023-12-14 PROBLEM — F41.0 PANIC DISORDER: Status: RESOLVED | Noted: 2023-09-15 | Resolved: 2023-12-14

## 2023-12-14 PROBLEM — F32.A ANXIETY AND DEPRESSION: Status: RESOLVED | Noted: 2019-05-20 | Resolved: 2023-12-14

## 2023-12-14 PROBLEM — Z34.83 MULTIGRAVIDA IN THIRD TRIMESTER (HHS-HCC): Status: RESOLVED | Noted: 2023-09-15 | Resolved: 2023-12-14

## 2023-12-14 PROBLEM — N96 RECURRENT PREGNANCY LOSS WITHOUT CURRENT PREGNANCY: Status: RESOLVED | Noted: 2023-09-15 | Resolved: 2023-12-14

## 2023-12-14 PROBLEM — R30.0 DYSURIA: Status: RESOLVED | Noted: 2023-09-15 | Resolved: 2023-12-14

## 2023-12-14 PROBLEM — Z3A.37 37 WEEKS GESTATION OF PREGNANCY (HHS-HCC): Status: RESOLVED | Noted: 2023-10-15 | Resolved: 2023-12-14

## 2023-12-14 PROBLEM — F42.9 OCD (OBSESSIVE COMPULSIVE DISORDER): Status: RESOLVED | Noted: 2023-09-15 | Resolved: 2023-12-14

## 2023-12-14 PROBLEM — M51.27 LUMBOSACRAL DISC HERNIATION: Status: RESOLVED | Noted: 2019-07-02 | Resolved: 2023-12-14

## 2023-12-14 PROBLEM — O09.299: Status: RESOLVED | Noted: 2023-09-15 | Resolved: 2023-12-14

## 2023-12-14 PROBLEM — F41.9 ANXIETY AND DEPRESSION: Status: RESOLVED | Noted: 2019-05-20 | Resolved: 2023-12-14

## 2023-12-14 PROCEDURE — 2500000001 HC RX 250 WO HCPCS SELF ADMINISTERED DRUGS (ALT 637 FOR MEDICARE OP): Performed by: ADVANCED PRACTICE MIDWIFE

## 2023-12-14 RX ORDER — BUSPIRONE HYDROCHLORIDE 15 MG/1
15 TABLET ORAL NIGHTLY
Qty: 30 TABLET | Refills: 2
Start: 2023-12-14 | End: 2024-02-22 | Stop reason: SDUPTHER

## 2023-12-14 RX ORDER — FLUOXETINE HYDROCHLORIDE 40 MG/1
40 CAPSULE ORAL NIGHTLY
Qty: 30 CAPSULE | Refills: 1 | Status: SHIPPED | OUTPATIENT
Start: 2023-12-14 | End: 2024-04-09

## 2023-12-14 RX ADMIN — FERROUS SULFATE TAB 325 MG (65 MG ELEMENTAL FE) 1 TABLET: 325 (65 FE) TAB at 09:27

## 2023-12-14 RX ADMIN — IBUPROFEN 600 MG: 600 TABLET ORAL at 04:00

## 2023-12-14 RX ADMIN — IBUPROFEN 600 MG: 600 TABLET ORAL at 09:30

## 2023-12-14 ASSESSMENT — PAIN - FUNCTIONAL ASSESSMENT: PAIN_FUNCTIONAL_ASSESSMENT: 0-10

## 2023-12-14 ASSESSMENT — PAIN SCALES - GENERAL
PAINLEVEL_OUTOF10: 0 - NO PAIN
PAINLEVEL_OUTOF10: 1
PAINLEVEL_OUTOF10: 0 - NO PAIN
PAINLEVEL_OUTOF10: 0 - NO PAIN

## 2023-12-14 NOTE — DISCHARGE SUMMARY
Discharge Diagnosis  Uterine contractions--Term pregnancy delivered    Issues Requiring Follow-Up  Mild anemia    Test Results Pending At Discharge  Pending Labs       No current pending labs.            Hospital Course   This patient was admitted in early labor and changed her cervix; she requested and received epidural anesthesia; she progressed after AROM to spontaneous vaginal delivery of living baby girl; she did not require repair; her post partum course has been uneventful; she will be discharged home for outpatient follow up. All patient questions answered.    Pertinent Physical Exam At Time of Discharge  Physical Exam  Vitals and nursing note reviewed.   Constitutional:       Appearance: Normal appearance.   HENT:      Head: Normocephalic.   Eyes:      Pupils: Pupils are equal, round, and reactive to light.   Pulmonary:      Effort: Pulmonary effort is normal.   Abdominal:      Palpations: Abdomen is soft.   Genitourinary:     General: Normal vulva.   Musculoskeletal:         General: Normal range of motion.      Cervical back: Normal range of motion.   Skin:     General: Skin is warm.   Neurological:      Mental Status: She is alert and oriented to person, place, and time.   Psychiatric:         Mood and Affect: Mood normal.         Behavior: Behavior normal.         Thought Content: Thought content normal.         Judgment: Judgment normal.         Home Medications     Medication List      Change how you take these medications     * busPIRone 15 mg tablet; Commonly known as: Buspar; What changed:   Another medication with the same name was added. Make sure you understand   how and when to take each.   * busPIRone 15 mg tablet; Commonly known as: Buspar; Take 1 tablet (15   mg) by mouth once daily at bedtime.; What changed: You were already taking   a medication with the same name, and this prescription was added. Make   sure you understand how and when to take each.   * FLUoxetine 40 mg capsule; Commonly  known as: PROzac; What changed:   Another medication with the same name was added. Make sure you understand   how and when to take each.   * FLUoxetine 40 mg capsule; Commonly known as: PROzac; Take 1 capsule   (40 mg) by mouth once daily at bedtime.; What changed: You were already   taking a medication with the same name, and this prescription was added.   Make sure you understand how and when to take each.  * This list has 4 medication(s) that are the same as other medications   prescribed for you. Read the directions carefully, and ask your doctor or   other care provider to review them with you.     Continue taking these medications     Prenatal with DHA-Folic Acid 400-32.5 mcg-mg tablet,chewable; Generic   drug: -folic-omega-3-fish oil     Stop taking these medications     aspirin 81 mg EC tablet   metoclopramide 5 mg tablet; Commonly known as: Reglan       Outpatient Follow-Up  No future appointments.    BARTOLO Otoole

## 2023-12-14 NOTE — DISCHARGE SUMMARY
Discharge Diagnosis  Uterine contractions    Issues Requiring Follow-Up  Mild anemia    Test Results Pending At Discharge  Pending Labs       No current pending labs.            Hospital Course   This patient was admitted for electi    Pertinent Physical Exam At Time of Discharge  Physical Exam  HENT:      Head: Normocephalic.   Eyes:      Pupils: Pupils are equal, round, and reactive to light.   Pulmonary:      Effort: Pulmonary effort is normal.   Abdominal:      Palpations: Abdomen is soft.   Genitourinary:     General: Normal vulva.   Musculoskeletal:         General: Normal range of motion.      Cervical back: Normal range of motion.   Skin:     General: Skin is warm and dry.   Neurological:      Mental Status: She is alert and oriented to person, place, and time.   Psychiatric:         Mood and Affect: Mood normal.         Behavior: Behavior normal.         Thought Content: Thought content normal.         Judgment: Judgment normal.     Fundus firm; small to moderate lochia rubra; perineum intact    Home Medications     Medication List      Change how you take these medications     * busPIRone 15 mg tablet; Commonly known as: Buspar; What changed:   Another medication with the same name was added. Make sure you understand   how and when to take each.   * busPIRone 15 mg tablet; Commonly known as: Buspar; Take 1 tablet (15   mg) by mouth once daily at bedtime.; What changed: You were already taking   a medication with the same name, and this prescription was added. Make   sure you understand how and when to take each.   * FLUoxetine 40 mg capsule; Commonly known as: PROzac; What changed:   Another medication with the same name was added. Make sure you understand   how and when to take each.   * FLUoxetine 40 mg capsule; Commonly known as: PROzac; Take 1 capsule   (40 mg) by mouth once daily at bedtime.; What changed: You were already   taking a medication with the same name, and this prescription was added.    Make sure you understand how and when to take each.  * This list has 4 medication(s) that are the same as other medications   prescribed for you. Read the directions carefully, and ask your doctor or   other care provider to review them with you.     Continue taking these medications     Prenatal with DHA-Folic Acid 400-32.5 mcg-mg tablet,chewable; Generic   drug: -folic-omega-3-fish oil     Stop taking these medications     aspirin 81 mg EC tablet   metoclopramide 5 mg tablet; Commonly known as: Reglan       Outpatient Follow-Up  No future appointments.    MK Otoole-JEAN

## 2023-12-14 NOTE — CARE PLAN
The patient's goals for the shift include get sleep and stay safe    The clinical goals for the shift include remain safe until end of shift

## 2023-12-18 LAB
BLOOD EXPIRATION DATE: NORMAL
DISPENSE STATUS: NORMAL
PRODUCT BLOOD TYPE: 5100
PRODUCT CODE: NORMAL
UNIT ABO: NORMAL
UNIT NUMBER: NORMAL
UNIT RH: NORMAL
UNIT VOLUME: 350
XM INTEP: NORMAL

## 2023-12-20 ENCOUNTER — APPOINTMENT (OUTPATIENT)
Dept: OBSTETRICS AND GYNECOLOGY | Facility: CLINIC | Age: 33
End: 2023-12-20
Payer: COMMERCIAL

## 2024-01-06 NOTE — ASSESSMENT & PLAN NOTE
Patient is doing well postpartum. She was advised to slowly increase activity. Pelvic rest is recommended until 6 weeks postpartum. She was advised to continue prenatal vitamins and to assure adequate hydration.   Contraception was discussed.   She was advised to call the office with any concerning symptom, worsening of pain or bleeding, concern for postpartum depression or anxiety.   Follow up is planned in 4 weeks. She will need pap test at this visit.

## 2024-01-10 ENCOUNTER — APPOINTMENT (OUTPATIENT)
Dept: OBSTETRICS AND GYNECOLOGY | Facility: CLINIC | Age: 34
End: 2024-01-10
Payer: COMMERCIAL

## 2024-01-10 NOTE — PROGRESS NOTES
"Postpartum Progress Note    Assessment/Plan   Veronica Cedeno is a 33 y.o.,  presents after  of a girl on 2023.         Problem List       No episode was linked to this visit.          Hospital course: {dx; complications postpartum ob:0209638570::\"no complications\"}       Subjective         She reports {complications; breast feedin::\"no breast or nursing problems\"}  She {postpartum moods:[id number]::\"denies emotional concerns\"} today   Her plan for contraception is {CONTRACEPTION:64917}           Allergies:   Oxycodone         Physical Exam:  OBGyn Exam     Problem List Items Addressed This Visit       Routine postpartum follow-up - Primary    Overview      of a girl on 2023.                "

## 2024-01-11 ENCOUNTER — APPOINTMENT (OUTPATIENT)
Dept: OBSTETRICS AND GYNECOLOGY | Facility: CLINIC | Age: 34
End: 2024-01-11
Payer: COMMERCIAL

## 2024-02-11 NOTE — PROGRESS NOTES
Patient ID: Veronica Cedeno is a 33 y.o. female.    IUD Insertion    Date/Time: 2/15/2024 7:39 AM    Performed by: Vanessa Diallo MD  Authorized by: Vanessa Diallo MD    Consent:     Consent obtained:  Written    Consent given by:  Patient    Procedure risks and benefits discussed: yes      Patient questions answered: yes      Patient agrees, verbalizes understanding, and wants to proceed: yes      Instructions and paperwork completed: yes    Procedure:     Pelvic exam performed: yes      Negative GC/chlamydia test: declines.      Negative urine pregnancy test: declines and assures there is no chance of pregnancy due to no intercourse since delivery.      Cervix cleaned and prepped: yes      Speculum placed in vagina: yes      Tenaculum applied to cervix: yes      Uterus sounded: yes      IUD inserted with no complications: yes      IUD type:  Mirena    Strings trimmed: yes    Post-procedure:     Patient tolerated procedure well: yes      Patient will follow up after next period: yes    Postpartum Progress Note    Assessment/Plan   Veronica Cedeno is a 33 y.o.,  presents for postpartum visit. She had a vaginal delivery on 2024 at 38.3 weeks gestation.  Delivery was rapid. She is doing well.       Problem List       No episode was linked to this visit.          Hospital course: no complications       Subjective         She reports no breast or nursing problems  She denies emotional concerns today   Her plan for contraception is IUD w/progesterone           Allergies:   Oxycodone         Physical Exam:  Physical Exam  Constitutional:       Appearance: Normal appearance.   Genitourinary:      Bladder, rectum and urethral meatus normal.      Right Labia: No rash or lesions.     Left Labia: No lesions or rash.     No vaginal discharge.      No vaginal prolapse present.     No vaginal atrophy present.       Right Adnexa: not tender and no mass present.     Left Adnexa: not tender and no mass  present.     No cervical discharge or lesion.      Uterus is not enlarged or tender.      Pelvic exam was performed with patient in the lithotomy position.   HENT:      Head: Normocephalic and atraumatic.      Nose: Nose normal.   Cardiovascular:      Rate and Rhythm: Normal rate and regular rhythm.      Heart sounds: Normal heart sounds.   Pulmonary:      Effort: Pulmonary effort is normal.      Breath sounds: Normal breath sounds.   Abdominal:      Palpations: Abdomen is soft.   Musculoskeletal:      Cervical back: Neck supple.   Neurological:      General: No focal deficit present.      Mental Status: She is alert and oriented to person, place, and time.   Skin:     General: Skin is warm and dry.      Findings: No rash.   Psychiatric:         Mood and Affect: Mood normal.          Problem List Items Addressed This Visit       Routine postpartum follow-up - Primary    Overview      of a girl on 2023.         Current Assessment & Plan     Patient is doing well postpartum. She was advised to slowly increase activity. Pelvic rest is recommended until 6 weeks postpartum. She was advised to continue prenatal vitamins and to assure adequate hydration.   Contraception was discussed. Mirena IUD is desired.   She was advised to call the office with any concerning symptom, worsening of pain or bleeding, concern for postpartum depression or anxiety.   Follow up is planned in 4 weeks for IUD check.          Encounter for insertion of mirena IUD    Overview     Mirena IUD was inserted on 2/15/2024.

## 2024-02-15 ENCOUNTER — POSTPARTUM VISIT (OUTPATIENT)
Dept: OBSTETRICS AND GYNECOLOGY | Facility: CLINIC | Age: 34
End: 2024-02-15
Payer: COMMERCIAL

## 2024-02-15 VITALS
SYSTOLIC BLOOD PRESSURE: 110 MMHG | DIASTOLIC BLOOD PRESSURE: 72 MMHG | BODY MASS INDEX: 27.3 KG/M2 | WEIGHT: 195 LBS | HEIGHT: 71 IN

## 2024-02-15 DIAGNOSIS — Z30.430 ENCOUNTER FOR INSERTION OF MIRENA IUD: ICD-10-CM

## 2024-02-15 PROBLEM — O99.013 ANEMIA, ANTEPARTUM, THIRD TRIMESTER (HHS-HCC): Status: RESOLVED | Noted: 2023-09-15 | Resolved: 2024-02-15

## 2024-02-15 PROCEDURE — 87624 HPV HI-RISK TYP POOLED RSLT: CPT

## 2024-02-15 PROCEDURE — 58300 INSERT INTRAUTERINE DEVICE: CPT | Performed by: OBSTETRICS & GYNECOLOGY

## 2024-02-15 PROCEDURE — 0503F POSTPARTUM CARE VISIT: CPT | Performed by: OBSTETRICS & GYNECOLOGY

## 2024-02-15 PROCEDURE — 88175 CYTOPATH C/V AUTO FLUID REDO: CPT

## 2024-02-15 NOTE — ASSESSMENT & PLAN NOTE
Patient is doing well postpartum. She was advised to slowly increase activity. Pelvic rest is recommended until 6 weeks postpartum. She was advised to continue prenatal vitamins and to assure adequate hydration.   Contraception was discussed. Mirena IUD is desired.   She was advised to call the office with any concerning symptom, worsening of pain or bleeding, concern for postpartum depression or anxiety.   Follow up is planned in 4 weeks for IUD check.

## 2024-02-22 DIAGNOSIS — F41.9 ANXIETY AND DEPRESSION: ICD-10-CM

## 2024-02-22 DIAGNOSIS — F32.A ANXIETY AND DEPRESSION: ICD-10-CM

## 2024-02-22 RX ORDER — BUSPIRONE HYDROCHLORIDE 15 MG/1
15 TABLET ORAL NIGHTLY
Qty: 90 TABLET | Refills: 3 | Status: SHIPPED | OUTPATIENT
Start: 2024-02-22

## 2024-02-29 LAB
CYTOLOGY CMNT CVX/VAG CYTO-IMP: NORMAL
HPV HR 12 DNA GENITAL QL NAA+PROBE: NEGATIVE
HPV HR GENOTYPES PNL CVX NAA+PROBE: NEGATIVE
HPV16 DNA SPEC QL NAA+PROBE: NEGATIVE
HPV18 DNA SPEC QL NAA+PROBE: NEGATIVE
LAB AP HPV GENOTYPE QUESTION: YES
LAB AP HPV HR: NORMAL
LABORATORY COMMENT REPORT: NORMAL
PATH REPORT.TOTAL CANCER: NORMAL

## 2024-03-11 PROBLEM — Z30.431 INTRAUTERINE DEVICE SURVEILLANCE: Status: ACTIVE | Noted: 2024-03-11

## 2024-03-11 NOTE — PROGRESS NOTES
Subjective   Patient ID: Veronica Cedeno is a 33 y.o. female who presents for No chief complaint on file..  She was seen for postpartum visit on 2/15/2024. Mirena IUD was inserted on that date without complication.         Review of Systems    Objective   Physical Exam    Problem List Items Addressed This Visit       Intrauterine device surveillance - Primary    Overview     Mirena IUD was inserted on 2/15/2024 at postpartum visit.                 Vanessa Diallo MD 03/11/24 11:20 AM

## 2024-03-12 RX ORDER — LEVONORGESTREL 52 MG/1
1 INTRAUTERINE DEVICE INTRAUTERINE ONCE
COMMUNITY

## 2024-03-13 ENCOUNTER — APPOINTMENT (OUTPATIENT)
Dept: OBSTETRICS AND GYNECOLOGY | Facility: CLINIC | Age: 34
End: 2024-03-13
Payer: COMMERCIAL

## 2024-04-09 DIAGNOSIS — F32.A ANXIETY AND DEPRESSION: ICD-10-CM

## 2024-04-09 DIAGNOSIS — F41.9 ANXIETY AND DEPRESSION: ICD-10-CM

## 2024-04-09 RX ORDER — FLUOXETINE HYDROCHLORIDE 40 MG/1
40 CAPSULE ORAL NIGHTLY
Qty: 90 CAPSULE | Refills: 3 | Status: SHIPPED | OUTPATIENT
Start: 2024-04-09

## 2024-04-21 PROBLEM — Z30.431 INTRAUTERINE DEVICE SURVEILLANCE: Status: ACTIVE | Noted: 2024-02-15

## 2024-05-08 ENCOUNTER — TELEPHONE (OUTPATIENT)
Dept: OBSTETRICS AND GYNECOLOGY | Facility: CLINIC | Age: 34
End: 2024-05-08
Payer: COMMERCIAL

## 2024-05-08 NOTE — TELEPHONE ENCOUNTER
Dr. Sukhjinder Manriquez called stating that patient has an 8 mm kidney stone on her left side and wanted you to know this. Sending a fax with information soon.

## 2024-05-24 ENCOUNTER — OFFICE VISIT (OUTPATIENT)
Dept: PRIMARY CARE | Facility: CLINIC | Age: 34
End: 2024-05-24
Payer: COMMERCIAL

## 2024-05-24 VITALS
WEIGHT: 210 LBS | TEMPERATURE: 97.8 F | DIASTOLIC BLOOD PRESSURE: 74 MMHG | HEART RATE: 76 BPM | BODY MASS INDEX: 29.4 KG/M2 | SYSTOLIC BLOOD PRESSURE: 122 MMHG | HEIGHT: 71 IN | OXYGEN SATURATION: 98 %

## 2024-05-24 DIAGNOSIS — Z00.00 ROUTINE GENERAL MEDICAL EXAMINATION AT A HEALTH CARE FACILITY: Primary | ICD-10-CM

## 2024-05-24 DIAGNOSIS — Z87.442 HISTORY OF KIDNEY STONES: ICD-10-CM

## 2024-05-24 DIAGNOSIS — M67.40 GANGLION CYST: ICD-10-CM

## 2024-05-24 DIAGNOSIS — Z13.29 SCREENING FOR THYROID DISORDER: ICD-10-CM

## 2024-05-24 DIAGNOSIS — E66.3 OVERWEIGHT: ICD-10-CM

## 2024-05-24 PROCEDURE — 1036F TOBACCO NON-USER: CPT | Performed by: FAMILY MEDICINE

## 2024-05-24 PROCEDURE — 99385 PREV VISIT NEW AGE 18-39: CPT | Performed by: FAMILY MEDICINE

## 2024-05-24 NOTE — PATIENT INSTRUCTIONS
Please call the back of your insurance card to find a therapist in your area. You can also try [facility] [phone] and [place] Anxiety: Renewed BuSpar and Prozac, provided information to find behavioral therapist  Overweight: Recommended male partner prep sources like weight watchers or blue apron and increasing physical activity to 30 minutes daily  Cyst: Located on rest likely ganglion cyst, will refer to hand orthopedics for evaluation  History of kidney stones: Will order renal ultrasound for evaluation

## 2024-06-03 ENCOUNTER — HOSPITAL ENCOUNTER (OUTPATIENT)
Dept: RADIOLOGY | Facility: CLINIC | Age: 34
End: 2024-06-03
Payer: COMMERCIAL

## 2024-06-07 ENCOUNTER — HOSPITAL ENCOUNTER (EMERGENCY)
Facility: HOSPITAL | Age: 34
Discharge: HOME | End: 2024-06-07
Attending: EMERGENCY MEDICINE
Payer: COMMERCIAL

## 2024-06-07 ENCOUNTER — APPOINTMENT (OUTPATIENT)
Dept: RADIOLOGY | Facility: HOSPITAL | Age: 34
End: 2024-06-07
Payer: COMMERCIAL

## 2024-06-07 VITALS
RESPIRATION RATE: 18 BRPM | TEMPERATURE: 97.8 F | DIASTOLIC BLOOD PRESSURE: 84 MMHG | WEIGHT: 207 LBS | HEIGHT: 71 IN | SYSTOLIC BLOOD PRESSURE: 125 MMHG | HEART RATE: 80 BPM | OXYGEN SATURATION: 100 % | BODY MASS INDEX: 28.98 KG/M2

## 2024-06-07 DIAGNOSIS — R04.2 HEMOPTYSIS: Primary | ICD-10-CM

## 2024-06-07 LAB
ANION GAP SERPL CALC-SCNC: 12 MMOL/L (ref 10–20)
BASOPHILS # BLD AUTO: 0.05 X10*3/UL (ref 0–0.1)
BASOPHILS NFR BLD AUTO: 0.5 %
BUN SERPL-MCNC: 10 MG/DL (ref 6–23)
CALCIUM SERPL-MCNC: 9.1 MG/DL (ref 8.6–10.3)
CHLORIDE SERPL-SCNC: 103 MMOL/L (ref 98–107)
CO2 SERPL-SCNC: 26 MMOL/L (ref 21–32)
CREAT SERPL-MCNC: 0.69 MG/DL (ref 0.5–1.05)
D DIMER PPP FEU-MCNC: 310 NG/ML FEU
EGFRCR SERPLBLD CKD-EPI 2021: >90 ML/MIN/1.73M*2
EOSINOPHIL # BLD AUTO: 0.07 X10*3/UL (ref 0–0.7)
EOSINOPHIL NFR BLD AUTO: 0.7 %
ERYTHROCYTE [DISTWIDTH] IN BLOOD BY AUTOMATED COUNT: 13 % (ref 11.5–14.5)
GLUCOSE SERPL-MCNC: 83 MG/DL (ref 74–99)
HCT VFR BLD AUTO: 39.4 % (ref 36–46)
HGB BLD-MCNC: 12.9 G/DL (ref 12–16)
IMM GRANULOCYTES # BLD AUTO: 0.02 X10*3/UL (ref 0–0.7)
IMM GRANULOCYTES NFR BLD AUTO: 0.2 % (ref 0–0.9)
LYMPHOCYTES # BLD AUTO: 3.36 X10*3/UL (ref 1.2–4.8)
LYMPHOCYTES NFR BLD AUTO: 35.9 %
MCH RBC QN AUTO: 27 PG (ref 26–34)
MCHC RBC AUTO-ENTMCNC: 32.7 G/DL (ref 32–36)
MCV RBC AUTO: 82 FL (ref 80–100)
MONOCYTES # BLD AUTO: 0.65 X10*3/UL (ref 0.1–1)
MONOCYTES NFR BLD AUTO: 6.9 %
NEUTROPHILS # BLD AUTO: 5.22 X10*3/UL (ref 1.2–7.7)
NEUTROPHILS NFR BLD AUTO: 55.8 %
NRBC BLD-RTO: 0 /100 WBCS (ref 0–0)
PLATELET # BLD AUTO: 335 X10*3/UL (ref 150–450)
POTASSIUM SERPL-SCNC: 3.9 MMOL/L (ref 3.5–5.3)
RBC # BLD AUTO: 4.78 X10*6/UL (ref 4–5.2)
SODIUM SERPL-SCNC: 137 MMOL/L (ref 136–145)
WBC # BLD AUTO: 9.4 X10*3/UL (ref 4.4–11.3)

## 2024-06-07 PROCEDURE — 36415 COLL VENOUS BLD VENIPUNCTURE: CPT | Performed by: EMERGENCY MEDICINE

## 2024-06-07 PROCEDURE — 85379 FIBRIN DEGRADATION QUANT: CPT | Performed by: EMERGENCY MEDICINE

## 2024-06-07 PROCEDURE — 80048 BASIC METABOLIC PNL TOTAL CA: CPT | Performed by: EMERGENCY MEDICINE

## 2024-06-07 PROCEDURE — 71046 X-RAY EXAM CHEST 2 VIEWS: CPT

## 2024-06-07 PROCEDURE — 85025 COMPLETE CBC W/AUTO DIFF WBC: CPT | Performed by: EMERGENCY MEDICINE

## 2024-06-07 PROCEDURE — 71046 X-RAY EXAM CHEST 2 VIEWS: CPT | Performed by: RADIOLOGY

## 2024-06-07 PROCEDURE — 99283 EMERGENCY DEPT VISIT LOW MDM: CPT | Mod: 25

## 2024-06-07 ASSESSMENT — LIFESTYLE VARIABLES
HAVE PEOPLE ANNOYED YOU BY CRITICIZING YOUR DRINKING: NO
TOTAL SCORE: 0
EVER HAD A DRINK FIRST THING IN THE MORNING TO STEADY YOUR NERVES TO GET RID OF A HANGOVER: NO
EVER FELT BAD OR GUILTY ABOUT YOUR DRINKING: NO
HAVE YOU EVER FELT YOU SHOULD CUT DOWN ON YOUR DRINKING: NO

## 2024-06-07 ASSESSMENT — PAIN - FUNCTIONAL ASSESSMENT: PAIN_FUNCTIONAL_ASSESSMENT: 0-10

## 2024-06-07 ASSESSMENT — PAIN SCALES - GENERAL: PAINLEVEL_OUTOF10: 0 - NO PAIN

## 2024-06-07 ASSESSMENT — COLUMBIA-SUICIDE SEVERITY RATING SCALE - C-SSRS
2. HAVE YOU ACTUALLY HAD ANY THOUGHTS OF KILLING YOURSELF?: NO
6. HAVE YOU EVER DONE ANYTHING, STARTED TO DO ANYTHING, OR PREPARED TO DO ANYTHING TO END YOUR LIFE?: NO
1. IN THE PAST MONTH, HAVE YOU WISHED YOU WERE DEAD OR WISHED YOU COULD GO TO SLEEP AND NOT WAKE UP?: NO

## 2024-06-07 NOTE — ED PROVIDER NOTES
HPI   Chief Complaint   Patient presents with   • Coughing Up Blood     0930 pt coughed up bright red blood, multiple episodes, no known illness light headed       Patient presents with hemoptysis.  She states that this morning she had about 5 episodes of coughing up bright red blood.  She states it was not mixed in with her sputum.  The last 1 occurred about an hour and a half ago.  She states that she has felt well over the past couple of days.  She has had no cough, shortness of breath or chest pain.  No fevers.  No sore throat.  She is on no blood thinning medicines.  She is on oral contraceptives but is not a smoker.  Denies any history of DVT/PE.  She is having no leg pain.  She states she does feel slightly lightheaded at this time.                          [place] Coma Scale Score: 15                  Patient History   Past Medical History:   Diagnosis Date   • Anemia    • Anemia, antepartum, third trimester ([facility]) 09/15/2023    She is taking iron.   • Anxiety    • Depression      Past Surgical History:   Procedure Laterality Date   • DILATION AND CURETTAGE OF UTERUS     • TONSILLECTOMY  02/22/2016    Tonsillectomy With Adenoidectomy     Family History   Problem Relation Name Age of Onset   • Depression Mother Ellen Richards    • Arthritis Mother Ellen Richards    • Anxiety disorder Mother Ellen Richards    • Endometriosis Mother Ellen Richards    • Miscarriages / Stillbirths Mother Ellen Richards    • Arthritis Father Amador Richards    • Other (Back pain) Father Amador Richards    • Depression Sister     • Anxiety disorder Sister       Social History     Tobacco Use   • Smoking status: Never   • Smokeless tobacco: Never   Vaping Use   • Vaping status: Never Used   Substance Use Topics   • Alcohol use: Never   • Drug use: Never       Physical Exam   ED Triage Vitals [06/07/24 1123]   Temperature Heart Rate Respirations BP   36.6 °C (97.8 °F) 80 18 125/84      Pulse Ox Temp src  Heart Rate Source Patient Position   100 % -- -- --      BP Location FiO2 (%)     -- --       Physical Exam  Vitals and nursing note reviewed.   Constitutional:       Appearance: Normal appearance.   HENT:      Head: Normocephalic and atraumatic.      Mouth/Throat:      Mouth: Mucous membranes are moist.      Comments: Throat not erythematous.  No active bleeding seen.  Eyes:      Extraocular Movements: Extraocular movements intact.      Pupils: Pupils are equal, round, and reactive to light.   Cardiovascular:      Rate and Rhythm: Normal rate and regular rhythm.      Heart sounds: No murmur heard.  Pulmonary:      Effort: Pulmonary effort is normal. No respiratory distress.      Breath sounds: Normal breath sounds.   Abdominal:      General: There is no distension.      Palpations: Abdomen is soft.      Tenderness: There is no abdominal tenderness.   Musculoskeletal:         General: No tenderness or deformity. Normal range of motion.      Right lower leg: No edema.      Left lower leg: No edema.   Skin:     General: Skin is warm and dry.      Findings: No lesion or rash.   Neurological:      General: No focal deficit present.      Mental Status: She is alert and oriented to person, place, and time.      Sensory: No sensory deficit.      Motor: No weakness.   Psychiatric:         Behavior: Behavior normal.       Labs Reviewed   CBC WITH AUTO DIFFERENTIAL   BASIC METABOLIC PANEL   D-DIMER, VTE EXCLUSION     XR chest 2 views    (Results Pending)     ED Course & MDM   Diagnoses as of 06/07/24 1315   Hemoptysis       Medical Decision Making  Differentials include bronchitis, pneumonia, PE.  Imaging studies, if performed, were independently reviewed and interpreted by myself and confirmed by radiologist. EKG(s), if performed, were interpreted by myself.  Patient had laboratory studies which were normal.  D-dimer normal.  2 view chest x-ray, per my interpretation, shows no evidence of pneumothorax.  No pneumonia.  Bony  structures are normal.  Upon reevaluation the patient has not had any episodes since she has been here.  She could have bronchial irritation causing this.  I recommended to Mucinex D over-the-counter to help with her symptoms.  She felt relieved that it was not a blood clot.  I think at this time she can be discharged home to follow-up with her PCP.        Procedure  Procedures     Miki Aguilar MD  06/07/24 4917

## 2024-06-12 ENCOUNTER — APPOINTMENT (OUTPATIENT)
Dept: RADIOLOGY | Facility: CLINIC | Age: 34
End: 2024-06-12
Payer: COMMERCIAL

## 2024-06-17 ENCOUNTER — APPOINTMENT (OUTPATIENT)
Dept: RADIOLOGY | Facility: CLINIC | Age: 34
End: 2024-06-17
Payer: COMMERCIAL

## 2024-06-20 ENCOUNTER — APPOINTMENT (OUTPATIENT)
Dept: ORTHOPEDIC SURGERY | Facility: CLINIC | Age: 34
End: 2024-06-20
Payer: COMMERCIAL

## 2024-06-20 DIAGNOSIS — M25.832 MASS OF JOINT OF LEFT WRIST: ICD-10-CM

## 2024-06-20 PROCEDURE — 1036F TOBACCO NON-USER: CPT | Performed by: ORTHOPAEDIC SURGERY

## 2024-06-20 PROCEDURE — 99204 OFFICE O/P NEW MOD 45 MIN: CPT | Performed by: ORTHOPAEDIC SURGERY

## 2024-06-20 NOTE — PROGRESS NOTES
CHIEF COMPLAINT         Ganglion cyst     ASSESSMENT + PLAN    Bilateral wrist masses just proximal to the pisiform in FCU, more tender on the left    I reviewed that this may represent a ganglion or may represent calcific tendinitis.  I discussed the benign nature of each of those diagnoses along with the typical natural history.  We discussed the option of continued simple observation versus surgical excision, along with the major risks and benefits of that procedure.  We agreed on going ahead with the surgery on the more symptomatic left side.  This will be done under sedation and local at the location of your convenience.  Contact the office to set up an exact date.    Continue with current symptomatic management in the meantime.        HISTORY OF PRESENT ILLNESS       Patient is a 33 y.o. left-hand dominant female , who presents today at suggestion of Dr. Otto for evaluation of bilateral volar ulnar wrist masses.  These have both been present for about 3 months.  The one on the left has been enlarging a little bit.  They are quite firm and are tender when bumped.  This happens with hand writing or typing.  No numbness or tingling in the fingers.  They do not wake her from sleep.  No difficulty with wrist motion.  No similar masses elsewhere.    She is not diabetic or hypothyroid.  She does not smoke.      REVIEW OF SYSTEMS       A 30-item multi-system Review Of Systems was obtained on today's intake form.  This was reviewed with the patient and is correct.  The pertinent positives and negatives are listed above.  The form has been scanned separately into the medical record.      PHYSICAL EXAM    Constitutional:    Appears stated age. Well-developed and well-nourished female in no acute distress.  Psychiatric:         Pleasant normal mood and affect. Behavior is appropriate for the situation.   Head:                   Normocephalic and atraumatic.  Eyes:                    Pupils are equal and  round.  Cardiovascular:  2+ radial and ulnar pulses. Fingers well-perfused.  Respiratory:        Effort normal. No respiratory distress. Speaking in complete sentences.  Neurologic:       Alert and oriented to person, place, and time.  Skin:                Skin is intact, warm and dry.  Hematologic / Lymphatic:    No lymphedema or lymphangitis.    Extremities / Musculoskeletal:                      Skin of both hands and wrists is intact.  No erythema, ecchymosis, or diffuse swelling.  Full composite finger flexion extension with full intrinsic plus minus posture bilaterally.  Good thumb opposition to station 9 on each side.  Good symmetric wrist and forearm motion.  There is a firm, mildly tender mass just proximal to the pisiform on each side, about 8 mm on the left and about 6 mm on the right.  Overlying skin is grossly normal.  Mass is nonpulsatile with no Tinel's sign.  It does track with pisiform.  It is not positioned to transilluminate.  Sensation intact to light touch in all distributions.  Capillary refill less than 2 seconds.  Negative PT compression and LT shear test.  Stable DRUJ in all stations.      IMAGING / LABS / EMGs           None pertinent      Past Medical History:   Diagnosis Date    Anemia     Anemia, antepartum, third trimester ([facility]) 09/15/2023    She is taking iron.    Anxiety     Depression        Medication Documentation Review Audit       Reviewed by Amber Otto DO (Physician) on 05/24/24 at 1156      Medication Order Taking? Sig Documenting Provider Last Dose Status   busPIRone (Buspar) 15 mg tablet [id number] Yes Take 1 tablet (15 mg) by mouth once daily at bedtime. Vanessa Diallo MD Taking Active   FLUoxetine (PROzac) 40 mg capsule [id number] Yes TAKE ONE CAPSULE BY MOUTH EVERY DAY AT BEDTIME Vanessa Diallo MD Taking Active   levonorgestrel (Mirena) 21 mcg/24 hours (8 yrs) 52 mg IUD [id number] Yes 52 mg by intrauterine route 1 time. Inserted 2/15/2024 Historical  Provider, MD Taking Active                    Allergies   Allergen Reactions    Oxycodone Unknown       Social History     Socioeconomic History    Marital status:      Spouse name: Not on file    Number of children: 3    Years of education: Not on file    Highest education level: Not on file   Occupational History    Not on file   Tobacco Use    Smoking status: Never    Smokeless tobacco: Never   Vaping Use    Vaping status: Never Used   Substance and Sexual Activity    Alcohol use: Never    Drug use: Never    Sexual activity: Yes     Partners: Male     Birth control/protection: I.U.D.   Other Topics Concern    Not on file   Social History Narrative    Not on file     Social Determinants of Health     Financial Resource Strain: Not on file   Food Insecurity: Not on file   Transportation Needs: Not on file   Physical Activity: Not on file   Stress: Not on file   Social Connections: Not on file   Intimate Partner Violence: Not on file       Past Surgical History:   Procedure Laterality Date    DILATION AND CURETTAGE OF UTERUS      TONSILLECTOMY  02/22/2016    Tonsillectomy With Adenoidectomy     SURGICAL INDICATION    I reviewed the options for further management of this condition and the likely success rates of each.  The patient feels that they have maximized the benefits of conservative care, and they do want to go on to surgery.    I reviewed the major risks of surgery including infection; scarring; damage to nerves, tendons, or vessels; stiffness; failure to relieve symptoms, recurrent symptoms, recurrent mass, and wound healing problems, as well as anesthesia risks.  I answered their questions to their satisfaction.  They were given my contact information and will contact the office when they are ready to schedule an exact surgical date.  Surgery will be posted as follows :    Dx :          Left volar ulnar wrist mass  ICD-10 :      M25.832  Procedure :     Excision left volar ulnar wrist mass  CPT :         19354   Anesth :    MAC  Location :   [facility]  Duration :    60 minutes  Specials :    None  PAT :       No  Post-Op Visit :    10-15 days      Electronically Signed      HERMINIA Verduzco MD      Orthopaedic Hand Surgery      [phone]

## 2024-06-20 NOTE — H&P (VIEW-ONLY)
CHIEF COMPLAINT         Ganglion cyst     ASSESSMENT + PLAN    Bilateral wrist masses just proximal to the pisiform in FCU, more tender on the left    I reviewed that this may represent a ganglion or may represent calcific tendinitis.  I discussed the benign nature of each of those diagnoses along with the typical natural history.  We discussed the option of continued simple observation versus surgical excision, along with the major risks and benefits of that procedure.  We agreed on going ahead with the surgery on the more symptomatic left side.  This will be done under sedation and local at the location of your convenience.  Contact the office to set up an exact date.    Continue with current symptomatic management in the meantime.        HISTORY OF PRESENT ILLNESS       Patient is a 33 y.o. left-hand dominant female , who presents today at suggestion of Dr. Otto for evaluation of bilateral volar ulnar wrist masses.  These have both been present for about 3 months.  The one on the left has been enlarging a little bit.  They are quite firm and are tender when bumped.  This happens with hand writing or typing.  No numbness or tingling in the fingers.  They do not wake her from sleep.  No difficulty with wrist motion.  No similar masses elsewhere.    She is not diabetic or hypothyroid.  She does not smoke.      REVIEW OF SYSTEMS       A 30-item multi-system Review Of Systems was obtained on today's intake form.  This was reviewed with the patient and is correct.  The pertinent positives and negatives are listed above.  The form has been scanned separately into the medical record.      PHYSICAL EXAM    Constitutional:    Appears stated age. Well-developed and well-nourished female in no acute distress.  Psychiatric:         Pleasant normal mood and affect. Behavior is appropriate for the situation.   Head:                   Normocephalic and atraumatic.  Eyes:                    Pupils are equal and  round.  Cardiovascular:  2+ radial and ulnar pulses. Fingers well-perfused.  Respiratory:        Effort normal. No respiratory distress. Speaking in complete sentences.  Neurologic:       Alert and oriented to person, place, and time.  Skin:                Skin is intact, warm and dry.  Hematologic / Lymphatic:    No lymphedema or lymphangitis.    Extremities / Musculoskeletal:                      Skin of both hands and wrists is intact.  No erythema, ecchymosis, or diffuse swelling.  Full composite finger flexion extension with full intrinsic plus minus posture bilaterally.  Good thumb opposition to station 9 on each side.  Good symmetric wrist and forearm motion.  There is a firm, mildly tender mass just proximal to the pisiform on each side, about 8 mm on the left and about 6 mm on the right.  Overlying skin is grossly normal.  Mass is nonpulsatile with no Tinel's sign.  It does track with pisiform.  It is not positioned to transilluminate.  Sensation intact to light touch in all distributions.  Capillary refill less than 2 seconds.  Negative PT compression and LT shear test.  Stable DRUJ in all stations.      IMAGING / LABS / EMGs           None pertinent      Past Medical History:   Diagnosis Date    Anemia     Anemia, antepartum, third trimester ([facility]) 09/15/2023    She is taking iron.    Anxiety     Depression        Medication Documentation Review Audit       Reviewed by Amber Otto DO (Physician) on 05/24/24 at 1156      Medication Order Taking? Sig Documenting Provider Last Dose Status   busPIRone (Buspar) 15 mg tablet [id number] Yes Take 1 tablet (15 mg) by mouth once daily at bedtime. Vanessa Diallo MD Taking Active   FLUoxetine (PROzac) 40 mg capsule [id number] Yes TAKE ONE CAPSULE BY MOUTH EVERY DAY AT BEDTIME Vanessa Diallo MD Taking Active   levonorgestrel (Mirena) 21 mcg/24 hours (8 yrs) 52 mg IUD [id number] Yes 52 mg by intrauterine route 1 time. Inserted 2/15/2024 Historical  Provider, MD Taking Active                    Allergies   Allergen Reactions    Oxycodone Unknown       Social History     Socioeconomic History    Marital status:      Spouse name: Not on file    Number of children: 3    Years of education: Not on file    Highest education level: Not on file   Occupational History    Not on file   Tobacco Use    Smoking status: Never    Smokeless tobacco: Never   Vaping Use    Vaping status: Never Used   Substance and Sexual Activity    Alcohol use: Never    Drug use: Never    Sexual activity: Yes     Partners: Male     Birth control/protection: I.U.D.   Other Topics Concern    Not on file   Social History Narrative    Not on file     Social Determinants of Health     Financial Resource Strain: Not on file   Food Insecurity: Not on file   Transportation Needs: Not on file   Physical Activity: Not on file   Stress: Not on file   Social Connections: Not on file   Intimate Partner Violence: Not on file       Past Surgical History:   Procedure Laterality Date    DILATION AND CURETTAGE OF UTERUS      TONSILLECTOMY  02/22/2016    Tonsillectomy With Adenoidectomy     SURGICAL INDICATION    I reviewed the options for further management of this condition and the likely success rates of each.  The patient feels that they have maximized the benefits of conservative care, and they do want to go on to surgery.    I reviewed the major risks of surgery including infection; scarring; damage to nerves, tendons, or vessels; stiffness; failure to relieve symptoms, recurrent symptoms, recurrent mass, and wound healing problems, as well as anesthesia risks.  I answered their questions to their satisfaction.  They were given my contact information and will contact the office when they are ready to schedule an exact surgical date.  Surgery will be posted as follows :    Dx :          Left volar ulnar wrist mass  ICD-10 :      M25.832  Procedure :     Excision left volar ulnar wrist mass  CPT :         57686   Anesth :    MAC  [facility] :   [facility]  Duration :    60 minutes  Specials :    None  PAT :       No  Post-Op Visit :    10-15 days      Electronically Signed      HERMINIA Verduzco MD      Orthopaedic Hand Surgery      [phone]

## 2024-06-20 NOTE — LETTER
June 23, 2024     Amber Otto DO  [address] [place] [id number]    Patient: Veronica Cedeno   YOB: 1990   Date of Visit: 6/20/2024       Dear Dr. Amber Otto DO:    Thank you for referring Veronica Cedeno to me for evaluation. Below are my notes for this consultation.  If you have questions, please do not hesitate to call me. I look forward to following your patient along with you.       Sincerely,     Saravanan Verduzco MD      CC: No Recipients  ______________________________________________________________________________________    CHIEF COMPLAINT         Ganglion cyst     ASSESSMENT + PLAN    Bilateral wrist masses just proximal to the pisiform in FCU, more tender on the left    I reviewed that this may represent a ganglion or may represent calcific tendinitis.  I discussed the benign nature of each of those diagnoses along with the typical natural history.  We discussed the option of continued simple observation versus surgical excision, along with the major risks and benefits of that procedure.  We agreed on going ahead with the surgery on the more symptomatic left side.  This will be done under sedation and local at the location of your convenience.  Contact the office to set up an exact date.    Continue with current symptomatic management in the meantime.        HISTORY OF PRESENT ILLNESS       Patient is a 33 y.o. left-hand dominant female , who presents today at suggestion of Dr. Otto for evaluation of bilateral volar ulnar wrist masses.  These have both been present for about 3 months.  The one on the left has been enlarging a little bit.  They are quite firm and are tender when bumped.  This happens with hand writing or typing.  No numbness or tingling in the fingers.  They do not wake her from sleep.  No difficulty with wrist motion.  No similar masses elsewhere.    She is not diabetic or hypothyroid.  She does not smoke.      REVIEW  OF SYSTEMS       A 30-item multi-system Review Of Systems was obtained on today's intake form.  This was reviewed with the patient and is correct.  The pertinent positives and negatives are listed above.  The form has been scanned separately into the medical record.      PHYSICAL EXAM    Constitutional:    Appears stated age. Well-developed and well-nourished female in no acute distress.  Psychiatric:         Pleasant normal mood and affect. Behavior is appropriate for the situation.   Head:                   Normocephalic and atraumatic.  Eyes:                    Pupils are equal and round.  Cardiovascular:  2+ radial and ulnar pulses. Fingers well-perfused.  Respiratory:        Effort normal. No respiratory distress. Speaking in complete sentences.  Neurologic:       Alert and oriented to person, place, and time.  Skin:                Skin is intact, warm and dry.  Hematologic / Lymphatic:    No lymphedema or lymphangitis.    Extremities / Musculoskeletal:                      Skin of both hands and wrists is intact.  No erythema, ecchymosis, or diffuse swelling.  Full composite finger flexion extension with full intrinsic plus minus posture bilaterally.  Good thumb opposition to station 9 on each side.  Good symmetric wrist and forearm motion.  There is a firm, mildly tender mass just proximal to the pisiform on each side, about 8 mm on the left and about 6 mm on the right.  Overlying skin is grossly normal.  Mass is nonpulsatile with no Tinel's sign.  It does track with pisiform.  It is not positioned to transilluminate.  Sensation intact to light touch in all distributions.  Capillary refill less than 2 seconds.  Negative PT compression and LT shear test.  Stable DRUJ in all stations.      IMAGING / LABS / EMGs           None pertinent      Past Medical History:   Diagnosis Date   • Anemia    • Anemia, antepartum, third trimester ([facility] 09/15/2023    She is taking iron.   • Anxiety    • Depression         Medication Documentation Review Audit       Reviewed by Amber Otto DO (Physician) on 05/24/24 at 1156      Medication Order Taking? Sig Documenting Provider Last Dose Status   busPIRone (Buspar) 15 mg tablet [id number] Yes Take 1 tablet (15 mg) by mouth once daily at bedtime. Vanessa Diallo MD Taking Active   FLUoxetine (PROzac) 40 mg capsule [id number] Yes TAKE ONE CAPSULE BY MOUTH EVERY DAY AT BEDTIME Vanessa Diallo MD Taking Active   levonorgestrel (Mirena) 21 mcg/24 hours (8 yrs) 52 mg IUD [id number] Yes 52 mg by intrauterine route 1 time. Inserted 2/15/2024 Historical Provider, MD Taking Active                    Allergies   Allergen Reactions   • Oxycodone Unknown       Social History     Socioeconomic History   • Marital status:      Spouse name: Not on file   • Number of children: 3   • Years of education: Not on file   • Highest education level: Not on file   Occupational History   • Not on file   Tobacco Use   • Smoking status: Never   • Smokeless tobacco: Never   Vaping Use   • Vaping status: Never Used   Substance and Sexual Activity   • Alcohol use: Never   • Drug use: Never   • Sexual activity: Yes     Partners: Male     Birth control/protection: I.U.D.   Other Topics Concern   • Not on file   Social History Narrative   • Not on file     Social Determinants of Health     Financial Resource Strain: Not on file   Food Insecurity: Not on file   Transportation Needs: Not on file   Physical Activity: Not on file   Stress: Not on file   Social Connections: Not on file   Intimate Partner Violence: Not on file       Past Surgical History:   Procedure Laterality Date   • DILATION AND CURETTAGE OF UTERUS     • TONSILLECTOMY  02/22/2016    Tonsillectomy With Adenoidectomy     SURGICAL INDICATION    I reviewed the options for further management of this condition and the likely success rates of each.  The patient feels that they have maximized the benefits of conservative care, and  they do want to go on to surgery.    I reviewed the major risks of surgery including infection; scarring; damage to nerves, tendons, or vessels; stiffness; failure to relieve symptoms, recurrent symptoms, recurrent mass, and wound healing problems, as well as anesthesia risks.  I answered their questions to their satisfaction.  They were given my contact information and will contact the office when they are ready to schedule an exact surgical date.  Surgery will be posted as follows :    Dx :          Left volar ulnar wrist mass  ICD-10 :      M25.832  Procedure :     Excision left volar ulnar wrist mass  CPT :        97372   Anesth :    MAC  Location :   [facility]  Duration :    60 minutes  Specials :    None  PAT :       No  Post-Op Visit :    10-15 days      Electronically Signed      HERMINIA Verduzco MD      Orthopaedic Hand Surgery      [phone]

## 2024-06-21 ENCOUNTER — APPOINTMENT (OUTPATIENT)
Dept: RADIOLOGY | Facility: CLINIC | Age: 34
End: 2024-06-21
Payer: COMMERCIAL

## 2024-06-23 PROBLEM — M25.832 MASS OF JOINT OF LEFT WRIST: Status: ACTIVE | Noted: 2024-06-23

## 2024-06-24 DIAGNOSIS — M25.832 DORSAL ABUTMENT SYNDROME OF WRIST, LEFT: ICD-10-CM

## 2024-06-25 PROBLEM — M25.832: Status: ACTIVE | Noted: 2024-06-24

## 2024-06-28 RX ORDER — NITROFURANTOIN 25; 75 MG/1; MG/1
CAPSULE ORAL EVERY 12 HOURS
COMMUNITY
Start: 2023-08-30 | End: 2024-07-05 | Stop reason: ALTCHOICE

## 2024-07-05 ENCOUNTER — OFFICE VISIT (OUTPATIENT)
Dept: PRIMARY CARE | Facility: CLINIC | Age: 34
End: 2024-07-05
Payer: COMMERCIAL

## 2024-07-05 VITALS
HEART RATE: 81 BPM | TEMPERATURE: 97.6 F | OXYGEN SATURATION: 97 % | DIASTOLIC BLOOD PRESSURE: 68 MMHG | SYSTOLIC BLOOD PRESSURE: 110 MMHG

## 2024-07-05 DIAGNOSIS — J02.0 STREP PHARYNGITIS: ICD-10-CM

## 2024-07-05 DIAGNOSIS — R09.81 SINUS CONGESTION: Primary | ICD-10-CM

## 2024-07-05 DIAGNOSIS — J02.9 SORE THROAT: ICD-10-CM

## 2024-07-05 LAB — POC RAPID STREP: POSITIVE

## 2024-07-05 PROCEDURE — 1036F TOBACCO NON-USER: CPT | Performed by: FAMILY MEDICINE

## 2024-07-05 PROCEDURE — 87634 RSV DNA/RNA AMP PROBE: CPT

## 2024-07-05 PROCEDURE — 99213 OFFICE O/P EST LOW 20 MIN: CPT | Performed by: FAMILY MEDICINE

## 2024-07-05 PROCEDURE — 87880 STREP A ASSAY W/OPTIC: CPT | Performed by: FAMILY MEDICINE

## 2024-07-05 PROCEDURE — 87636 SARSCOV2 & INF A&B AMP PRB: CPT

## 2024-07-05 RX ORDER — PENICILLIN V POTASSIUM 500 MG/1
500 TABLET, FILM COATED ORAL 2 TIMES DAILY
Qty: 20 TABLET | Refills: 0 | Status: SHIPPED | OUTPATIENT
Start: 2024-07-05 | End: 2024-07-15

## 2024-07-05 RX ORDER — AZITHROMYCIN 250 MG/1
TABLET, FILM COATED ORAL DAILY
Qty: 6 TABLET | Refills: 0 | Status: SHIPPED | OUTPATIENT
Start: 2024-07-05 | End: 2024-07-05 | Stop reason: ENTERED-IN-ERROR

## 2024-07-05 RX ORDER — FLUTICASONE PROPIONATE 50 MCG
1 SPRAY, SUSPENSION (ML) NASAL DAILY
Qty: 16 G | Refills: 0 | Status: SHIPPED | OUTPATIENT
Start: 2024-07-05 | End: 2025-07-05

## 2024-07-05 ASSESSMENT — ENCOUNTER SYMPTOMS
ABDOMINAL PAIN: 0
DIARRHEA: 0
VOMITING: 0
NECK PAIN: 0
RHINORRHEA: 1
COUGH: 1
SORE THROAT: 1
HEADACHES: 1

## 2024-07-05 NOTE — PROGRESS NOTES
Assessment/Plan   ASSESSMENT/PLAN:      Patient Instructions   Try flonase and nasal lavage over the weekend, start pcn v for strep     Amber Otto DO     FUTURE DIRECTION:     Subjective   SUBJECTIVE:     Patient ID: Veronica Cedeno is a 33 y.o. femalefor the following:    Viral syndrome  runny nose , coughing and sneezing, sore throat, bl ear pain for the past 6 days with associated Fever (tmax) 101.2 with body aches initially that resolved.  No improvement with otc medication   Exposed to children with similar sx's     Review of Systems   HENT:  Positive for ear pain, rhinorrhea and sore throat. Negative for ear discharge and hearing loss.    Respiratory:  Positive for cough.    Gastrointestinal:  Negative for abdominal pain, diarrhea and vomiting.   Musculoskeletal:  Negative for neck pain.   Skin:  Negative for rash.   Neurological:  Positive for headaches.       Allergies   Allergen Reactions    Oxycodone Rash         Current Outpatient Medications:     busPIRone (Buspar) 15 mg tablet, Take 1 tablet (15 mg) by mouth once daily at bedtime., Disp: 90 tablet, Rfl: 3    FLUoxetine (PROzac) 40 mg capsule, TAKE ONE CAPSULE BY MOUTH EVERY DAY AT BEDTIME, Disp: 90 capsule, Rfl: 3    levonorgestrel (Mirena) 21 mcg/24 hours (8 yrs) 52 mg IUD, 52 mg by intrauterine route 1 time. Inserted 2/15/2024, Disp: , Rfl:     semaglutide (Ozempic) 1 mg/dose (2 mg/1.5 mL) pen injector, Inject 1 mg under the skin 1 (one) time per week., Disp: , Rfl:     fluticasone (Flonase) 50 mcg/actuation nasal spray, Administer 1 spray into each nostril once daily. Shake gently. Before first use, prime pump. After use, clean tip and replace cap., Disp: 16 g, Rfl: 0    penicillin v potassium (Veetid) 500 mg tablet, Take 1 tablet (500 mg) by mouth 2 times a day for 10 days., Disp: 20 tablet, Rfl: 0     Patient Active Problem List   Diagnosis    Routine postpartum follow-up ([facility])    Intrauterine device surveillance    Mass of joint  of left wrist    Dorsal abutment syndrome of wrist, left       Social History     Socioeconomic History    Marital status:      Spouse name: Not on file    Number of children: 3    Years of education: Not on file    Highest education level: Not on file   Occupational History    Not on file   Tobacco Use    Smoking status: Never    Smokeless tobacco: Never    Tobacco comments:     No history of anesthesia reactions. No family history of MH.     Had a cold this month with lingering hoarseness, no fever, no other symptoms.     Does not get SOB with a flight of stairs.     Does not use a cane, walker, or wheelchair.         Vaping Use    Vaping status: Never Used   Substance and Sexual Activity    Alcohol use: Never    Drug use: Never    Sexual activity: Yes     Partners: Male     Birth control/protection: I.U.D.   Other Topics Concern    Not on file   Social History Narrative    Not on file     Social Determinants of Health     Financial Resource Strain: Not on file   Food Insecurity: Not on file   Transportation Needs: Not on file   Physical Activity: Not on file   Stress: Not on file   Social Connections: Not on file   Intimate Partner Violence: Not on file       Objective   OBJECTIVE:     Vitals:    07/05/24 1649   BP: 110/68   Pulse: 81   Temp: 36.4 °C (97.6 °F)   SpO2: 97%       Exam      Physical Exam  Constitutional:       Appearance: Normal appearance.   HENT:      Head: Normocephalic.      Right Ear: Tympanic membrane normal.      Left Ear: Tympanic membrane normal.      Nose: No congestion.      Mouth/Throat:      Pharynx: Posterior oropharyngeal erythema present.   Cardiovascular:      Rate and Rhythm: Normal rate and regular rhythm.   Pulmonary:      Effort: Pulmonary effort is normal.      Breath sounds: No wheezing.   Musculoskeletal:      Cervical back: Normal range of motion.   Neurological:      Mental Status: She is alert.   Psychiatric:         Mood and Affect: Mood normal.

## 2024-07-05 NOTE — PROGRESS NOTES
Called patient and left message stating that we are changing abx from zpack to pcn V because she is positive for strep.

## 2024-07-06 LAB
FLUAV RNA RESP QL NAA+PROBE: NOT DETECTED
FLUBV RNA RESP QL NAA+PROBE: NOT DETECTED
RSV RNA RESP QL NAA+PROBE: NOT DETECTED
SARS-COV-2 RNA RESP QL NAA+PROBE: NOT DETECTED

## 2024-07-10 ENCOUNTER — ANESTHESIA EVENT (OUTPATIENT)
Dept: OPERATING ROOM | Facility: CLINIC | Age: 34
End: 2024-07-10
Payer: COMMERCIAL

## 2024-07-11 ENCOUNTER — HOSPITAL ENCOUNTER (OUTPATIENT)
Facility: CLINIC | Age: 34
Setting detail: OUTPATIENT SURGERY
Discharge: HOME | End: 2024-07-11
Attending: ORTHOPAEDIC SURGERY | Admitting: ORTHOPAEDIC SURGERY
Payer: COMMERCIAL

## 2024-07-11 ENCOUNTER — ANESTHESIA (OUTPATIENT)
Dept: OPERATING ROOM | Facility: CLINIC | Age: 34
End: 2024-07-11
Payer: COMMERCIAL

## 2024-07-11 VITALS
WEIGHT: 200.18 LBS | HEART RATE: 69 BPM | BODY MASS INDEX: 28.02 KG/M2 | OXYGEN SATURATION: 99 % | RESPIRATION RATE: 16 BRPM | SYSTOLIC BLOOD PRESSURE: 142 MMHG | HEIGHT: 71 IN | TEMPERATURE: 97.2 F | DIASTOLIC BLOOD PRESSURE: 63 MMHG

## 2024-07-11 DIAGNOSIS — M25.832 MASS OF JOINT OF LEFT WRIST: Primary | ICD-10-CM

## 2024-07-11 DIAGNOSIS — M25.832 DORSAL ABUTMENT SYNDROME OF WRIST, LEFT: ICD-10-CM

## 2024-07-11 PROCEDURE — 7100000009 HC PHASE TWO TIME - INITIAL BASE CHARGE: Performed by: ORTHOPAEDIC SURGERY

## 2024-07-11 PROCEDURE — 2500000005 HC RX 250 GENERAL PHARMACY W/O HCPCS: Performed by: ORTHOPAEDIC SURGERY

## 2024-07-11 PROCEDURE — 3700000001 HC GENERAL ANESTHESIA TIME - INITIAL BASE CHARGE: Performed by: ORTHOPAEDIC SURGERY

## 2024-07-11 PROCEDURE — 2500000004 HC RX 250 GENERAL PHARMACY W/ HCPCS (ALT 636 FOR OP/ED): Performed by: ORTHOPAEDIC SURGERY

## 2024-07-11 PROCEDURE — 2500000004 HC RX 250 GENERAL PHARMACY W/ HCPCS (ALT 636 FOR OP/ED): Performed by: ANESTHESIOLOGIST ASSISTANT

## 2024-07-11 PROCEDURE — 3700000002 HC GENERAL ANESTHESIA TIME - EACH INCREMENTAL 1 MINUTE: Performed by: ORTHOPAEDIC SURGERY

## 2024-07-11 PROCEDURE — A26116 PR EXC TUM/VAS MAL SFT TIS HAND/FNGR SUBFASC<1.5CM: Performed by: ANESTHESIOLOGIST ASSISTANT

## 2024-07-11 PROCEDURE — 3600000002 HC OR TIME - INITIAL BASE CHARGE - PROCEDURE LEVEL TWO: Performed by: ORTHOPAEDIC SURGERY

## 2024-07-11 PROCEDURE — 26116 EXC HAND TUM DEEP < 1.5 CM: CPT | Performed by: ORTHOPAEDIC SURGERY

## 2024-07-11 PROCEDURE — A26116 PR EXC TUM/VAS MAL SFT TIS HAND/FNGR SUBFASC<1.5CM: Performed by: ANESTHESIOLOGY

## 2024-07-11 PROCEDURE — 7100000010 HC PHASE TWO TIME - EACH INCREMENTAL 1 MINUTE: Performed by: ORTHOPAEDIC SURGERY

## 2024-07-11 PROCEDURE — 88304 TISSUE EXAM BY PATHOLOGIST: CPT | Mod: TC,SUR,ELYLAB | Performed by: ORTHOPAEDIC SURGERY

## 2024-07-11 PROCEDURE — 3600000007 HC OR TIME - EACH INCREMENTAL 1 MINUTE - PROCEDURE LEVEL TWO: Performed by: ORTHOPAEDIC SURGERY

## 2024-07-11 RX ORDER — ACETAMINOPHEN 325 MG/1
TABLET ORAL AS NEEDED
Status: DISCONTINUED | OUTPATIENT
Start: 2024-07-11 | End: 2024-07-11

## 2024-07-11 RX ORDER — SODIUM CHLORIDE 0.9 G/100ML
IRRIGANT IRRIGATION AS NEEDED
Status: DISCONTINUED | OUTPATIENT
Start: 2024-07-11 | End: 2024-07-11 | Stop reason: HOSPADM

## 2024-07-11 RX ORDER — CEFAZOLIN SODIUM 2 G/100ML
2 INJECTION, SOLUTION INTRAVENOUS ONCE
Status: DISCONTINUED | OUTPATIENT
Start: 2024-07-11 | End: 2024-07-11 | Stop reason: HOSPADM

## 2024-07-11 RX ORDER — FENTANYL CITRATE 50 UG/ML
INJECTION, SOLUTION INTRAMUSCULAR; INTRAVENOUS AS NEEDED
Status: DISCONTINUED | OUTPATIENT
Start: 2024-07-11 | End: 2024-07-11

## 2024-07-11 RX ORDER — SODIUM CHLORIDE, SODIUM LACTATE, POTASSIUM CHLORIDE, CALCIUM CHLORIDE 600; 310; 30; 20 MG/100ML; MG/100ML; MG/100ML; MG/100ML
100 INJECTION, SOLUTION INTRAVENOUS CONTINUOUS
Status: DISCONTINUED | OUTPATIENT
Start: 2024-07-11 | End: 2024-07-11 | Stop reason: HOSPADM

## 2024-07-11 RX ORDER — HYDROMORPHONE HYDROCHLORIDE 0.2 MG/ML
0.2 INJECTION INTRAMUSCULAR; INTRAVENOUS; SUBCUTANEOUS EVERY 5 MIN PRN
Status: DISCONTINUED | OUTPATIENT
Start: 2024-07-11 | End: 2024-07-11 | Stop reason: HOSPADM

## 2024-07-11 RX ORDER — SODIUM CHLORIDE, SODIUM LACTATE, POTASSIUM CHLORIDE, CALCIUM CHLORIDE 600; 310; 30; 20 MG/100ML; MG/100ML; MG/100ML; MG/100ML
INJECTION, SOLUTION INTRAVENOUS CONTINUOUS PRN
Status: DISCONTINUED | OUTPATIENT
Start: 2024-07-11 | End: 2024-07-11

## 2024-07-11 RX ORDER — BUPIVACAINE HYDROCHLORIDE 5 MG/ML
INJECTION, SOLUTION EPIDURAL; INTRACAUDAL AS NEEDED
Status: DISCONTINUED | OUTPATIENT
Start: 2024-07-11 | End: 2024-07-11 | Stop reason: HOSPADM

## 2024-07-11 RX ORDER — METOCLOPRAMIDE HYDROCHLORIDE 5 MG/ML
10 INJECTION INTRAMUSCULAR; INTRAVENOUS ONCE AS NEEDED
Status: DISCONTINUED | OUTPATIENT
Start: 2024-07-11 | End: 2024-07-11 | Stop reason: HOSPADM

## 2024-07-11 RX ORDER — LIDOCAINE HYDROCHLORIDE 10 MG/ML
INJECTION INFILTRATION; PERINEURAL AS NEEDED
Status: DISCONTINUED | OUTPATIENT
Start: 2024-07-11 | End: 2024-07-11 | Stop reason: HOSPADM

## 2024-07-11 RX ORDER — CEFAZOLIN 1 G/1
INJECTION, POWDER, FOR SOLUTION INTRAVENOUS AS NEEDED
Status: DISCONTINUED | OUTPATIENT
Start: 2024-07-11 | End: 2024-07-11

## 2024-07-11 RX ORDER — ALBUTEROL SULFATE 0.83 MG/ML
2.5 SOLUTION RESPIRATORY (INHALATION) ONCE AS NEEDED
Status: DISCONTINUED | OUTPATIENT
Start: 2024-07-11 | End: 2024-07-11 | Stop reason: HOSPADM

## 2024-07-11 RX ORDER — TRAMADOL HYDROCHLORIDE 50 MG/1
50 TABLET ORAL EVERY 6 HOURS PRN
Qty: 12 TABLET | Refills: 0 | Status: SHIPPED | OUTPATIENT
Start: 2024-07-11

## 2024-07-11 RX ORDER — PROPOFOL 10 MG/ML
INJECTION, EMULSION INTRAVENOUS AS NEEDED
Status: DISCONTINUED | OUTPATIENT
Start: 2024-07-11 | End: 2024-07-11

## 2024-07-11 RX ORDER — MIDAZOLAM HYDROCHLORIDE 1 MG/ML
INJECTION, SOLUTION INTRAMUSCULAR; INTRAVENOUS AS NEEDED
Status: DISCONTINUED | OUTPATIENT
Start: 2024-07-11 | End: 2024-07-11

## 2024-07-11 RX ORDER — LIDOCAINE IN NACL,ISO-OSMOT/PF 30 MG/3 ML
0.1 SYRINGE (ML) INJECTION ONCE
Status: DISCONTINUED | OUTPATIENT
Start: 2024-07-11 | End: 2024-07-11 | Stop reason: HOSPADM

## 2024-07-11 RX ORDER — ONDANSETRON HYDROCHLORIDE 2 MG/ML
INJECTION, SOLUTION INTRAVENOUS AS NEEDED
Status: DISCONTINUED | OUTPATIENT
Start: 2024-07-11 | End: 2024-07-11

## 2024-07-11 RX ORDER — ONDANSETRON HYDROCHLORIDE 2 MG/ML
4 INJECTION, SOLUTION INTRAVENOUS ONCE AS NEEDED
Status: DISCONTINUED | OUTPATIENT
Start: 2024-07-11 | End: 2024-07-11 | Stop reason: HOSPADM

## 2024-07-11 ASSESSMENT — PAIN - FUNCTIONAL ASSESSMENT
PAIN_FUNCTIONAL_ASSESSMENT: 0-10

## 2024-07-11 ASSESSMENT — PAIN SCALES - GENERAL
PAINLEVEL_OUTOF10: 0 - NO PAIN
PAINLEVEL_OUTOF10: 0 - NO PAIN
PAINLEVEL_OUTOF10: 2
PAINLEVEL_OUTOF10: 0 - NO PAIN
PAINLEVEL_OUTOF10: 2

## 2024-07-11 ASSESSMENT — COLUMBIA-SUICIDE SEVERITY RATING SCALE - C-SSRS
2. HAVE YOU ACTUALLY HAD ANY THOUGHTS OF KILLING YOURSELF?: NO
1. IN THE PAST MONTH, HAVE YOU WISHED YOU WERE DEAD OR WISHED YOU COULD GO TO SLEEP AND NOT WAKE UP?: NO
6. HAVE YOU EVER DONE ANYTHING, STARTED TO DO ANYTHING, OR PREPARED TO DO ANYTHING TO END YOUR LIFE?: NO

## 2024-07-11 NOTE — ANESTHESIA POSTPROCEDURE EVALUATION
Patient: Veronica Cedeno    Procedure Summary       Date: 07/11/24 Room / Location: [facility] / [facility]    Anesthesia Start: 1102 Anesthesia Stop: 1151    Procedure: Excision Left Volar Ulnar Wrist Mass (Left: Wrist) Diagnosis:       Dorsal abutment syndrome of wrist, left      (Dorsal abutment syndrome of wrist, left [M25.832])    Surgeons: Saravanan Verduzco MD Responsible Provider: Julian Barksdale MD    Anesthesia Type: MAC ASA Status: 2            Anesthesia Type: MAC    Vitals Value Taken Time   /63 07/11/24 1231   Temp 36.2 °C (97.2 °F) 07/11/24 1231   Pulse 69 07/11/24 1231   Resp 16 07/11/24 1231   SpO2 99 % 07/11/24 1231       Anesthesia Post Evaluation    Patient location during evaluation: PACU  Patient participation: complete - patient participated  Level of consciousness: awake  Pain management: satisfactory to patient  Airway patency: patent  Cardiovascular status: acceptable  Respiratory status: acceptable  Hydration status: acceptable  Postoperative Nausea and Vomiting: none  Comments: Did well    No notable events documented.

## 2024-07-11 NOTE — OP NOTE
ORTHOPEDIC OPERATIVE NOTE    Name:     Veronica Cedeno  :     1990  Facility:    [facility]  Date of Surgery:   2024     PREOP DX:          Left volar ulnar wrist mass    POSTOP DX:       Same    PROCEDURE:     Excision of left volar ulnar wrist mass    SURGEON: JR Dax MD    RESIDENT/FELLOW/ASSISTANT:  None    ANESTHESIA:    Sedation plus local    ESTIMATED BLOOD LOSS :   2 ml    TOTAL FLUIDS:     400 cc LR    SPECIMEN:   Wrist mass    TOURNIQUET TIME:  15 Minutes at 250 mmHg    COMPLICATIONS:  None    PATIENT RETURNED TO/CONDITION:  PACU in Good      INDICATIONS:      Veronica Cedeno is a 33 y.o., right-hand-dominant female who presents with an enlarging mass at the left volar ulnar wrist just proximal to pisiform.  This is painful when bumped, which is occurring more as it becomes larger.  She is here for elective excision.  I reminded her of surgical risks of infection, scarring, damage to nerves, tendons, or vessels, stiffness, wound healing problems, failure to relieve symptoms, recurrent symptoms, and recurrent mass.  She voiced understanding and wished to proceed.      NARRATIVE:       Following identification of patient and confirmation of correct site of surgery and signed operative consent, she was brought to the operating room and a hand table affixed to the cart.  A light MAC sedation was administered by Anesthesia, along with IV antibiotic dose.  A pneumatic tourniquet was placed high on the left arm, and the limb was prepped from fingertip to cuff with chlorhexidine, and draped free in the usual sterile fashion.  5 cc of a mix of half percent Marcaine and 1% lidocaine plain was instilled to the planned incision area.  The limb was exsanguinated with an Esmarch, and the plated.    A 1 cm transverse incision was made in the palmar crease, just distal to the 10 x 8 x 8 mm mass, and taken carefully bluntly down under high loupe magnification.  The mass was encountered in  the subcutaneous tissue, and was a firm white fibrous nodule just superficial to, and separate from, the FCU tendon.  It was carefully dissected away from surrounding structures and the underlying tendon.  It was then excised and sent to Pathology as specimen.  There was some prominent ridging of the underlying pisiform bone.  A longitudinal split was made in the FCU tendon and the tendon was dissected off the superficial aspect of the bone.  The bone ridge was then resected back to a smooth contour using a small rongeur.  After irrigation, the tendon was repaired side-to-side with 4-0 Vicryl.  The tourniquet was then deflated, and pink color rapidly returned to all digits.  Meticulous hemostasis was achieved with bipolar.  After final irrigation, skin was closed with 4-0 Vicryl subcutaneous and 4-0 Monocryl interrupted simple skin stitch.  Xeroform and soft dressing was applied.  Patient was awakened and transferred to Recovery in stable condition.          Electronically signed  HERMINIA Verduzco MD  [phone]

## 2024-07-11 NOTE — DISCHARGE INSTRUCTIONS
Follow-Up Instructions    You will need to be seen in clinic in 10-15 days for a post-operative evaluation.  This appointment will be in the outpatient office, not at the Surgery Center.    You will need to call Amina in my office and schedule an appointment, unless there is a previous appointment that appears on your discharge instructions.  Her phone number is [phone].  Please do not delay in calling to make this appointment.      Activity Restrictions    1)  No driving for 24 hours after surgery, due to the anesthetic.    2)  No driving or operating heavy machinery while taking narcotic pain medication.    3)  Weight bearing as tolerated.  Light use of the fingers (writing, typing, texting) is good to do.     Discharge Medications    A prescription for a narcotic pain medication (Norco) has been sent to your pharmacy of record.  I do not expect you will need this, but wanted you to have it available as an option if over-the-counter medications are not adequately controlling your pain.  Most people simply take Tylenol, Motrin, Advil, or other anti-inflammatory for the pain.  If you do end up taking the prescription medication, please try to wean yourself off this as quickly as possible.    You can add the prescription medication to the anti-inflammatories if needed, but should not add it to Tylenol, as there is already Tylenol in the prescription.    Wound care instructions:     1)  Leave operative dressing in place for 7 days.  If you shower, cover the hand with a plastic bag and elevate it so the water cannot run down into the bag.    2)  After 7 days, remove the bandage and leave the incision open to air, or cover with a simple Band-Aid.   At that point, you may let water run freely over incision when showering.  Do not scrub.  Do not soak in pool or tub, or submerge the incision until you are fully 21 days from surgery.    3)  Call if any drainage after 7 days, increased redness/warmth/swelling at  incision site, abnormal pain/tenderness of the extremity, abnormal swelling of the extremity that does not respond to elevation, shortness of breath, or chest pain.    TO REACH YOUR PHYSICIAN AFTER HOURS CALL  AND ASK FOR THE PHYSICIAN ON CALL  May have Tylenol after: 4:00 pm    May have Ibuprofen/advil/motrin/aleve after: anytime after dc

## 2024-07-11 NOTE — ANESTHESIA PREPROCEDURE EVALUATION
Patient: Veronica Cedeno    Procedure Information       Date/Time: 07/11/24 1030    Procedure: Excision Left Volar Ulnar Wrist Mass (Left: Wrist)    Location: [facility] OR 03 / Virtual [facility]    Surgeons: Saravanan Verduzco MD            Relevant Problems   Anesthesia (within normal limits)      Cardiac (within normal limits)      Pulmonary (within normal limits)      Neuro   (+) Anxiety      GI (within normal limits)      /Renal (within normal limits)      Liver (within normal limits)      Endocrine (within normal limits)      Hematology (within normal limits)      Musculoskeletal (within normal limits)      HEENT (within normal limits)      ID (within normal limits)      Skin (within normal limits)      GYN (within normal limits)       Clinical information reviewed:   Tobacco  Allergies  Meds   Med Hx  Surg Hx  OB Status  Fam Hx  Soc   Hx        NPO Detail:  NPO/Void Status  Date of Last Liquid: 07/10/24  Time of Last Liquid: 2030  Date of Last Solid: 07/10/24  Time of Last Solid: 2030  Last Intake Type: Clear fluids; Food  Time of Last Void: 0800         Physical Exam    Airway  Mallampati: I  TM distance: >3 FB  Neck ROM: full     Cardiovascular   Rhythm: regular  Rate: normal     Dental    Pulmonary   Breath sounds clear to auscultation     Abdominal      Other findings: Denies loose/chipped/cracked teeth           Anesthesia Plan    History of general anesthesia?: yes  History of complications of general anesthesia?: no    ASA 2     MAC   (NPO>MN, MAC anesthesia explained, pt okay to proceed. Denies smoking history, ETOH use, or illicit drug use.)  Anesthetic plan and risks discussed with patient.    Plan discussed with CAA and attending.

## 2024-07-19 LAB
LABORATORY COMMENT REPORT: NORMAL
PATH REPORT.FINAL DX SPEC: NORMAL
PATH REPORT.GROSS SPEC: NORMAL
PATH REPORT.RELEVANT HX SPEC: NORMAL
PATH REPORT.TOTAL CANCER: NORMAL

## 2024-07-26 ENCOUNTER — OFFICE VISIT (OUTPATIENT)
Dept: ORTHOPEDIC SURGERY | Facility: HOSPITAL | Age: 34
End: 2024-07-26
Payer: COMMERCIAL

## 2024-07-26 DIAGNOSIS — R22.32 MASS OF LEFT HAND: Primary | ICD-10-CM

## 2024-07-26 PROCEDURE — 1036F TOBACCO NON-USER: CPT | Performed by: ORTHOPAEDIC SURGERY

## 2024-07-26 PROCEDURE — 99211 OFF/OP EST MAY X REQ PHY/QHP: CPT | Performed by: ORTHOPAEDIC SURGERY

## 2024-07-26 NOTE — LETTER
July 26, 2024     Amber Otto DO  [address] [place] [id number]    Patient: Veronica Cedeno   YOB: 1990   Date of Visit: 7/26/2024       Dear Dr. Amber Otto DO:    Thank you for referring Veronica Cedeno to me for evaluation. Below are my notes for this consultation.  If you have questions, please do not hesitate to call me. I look forward to following your patient along with you.       Sincerely,     Saravanan Verduzco MD      CC: No Recipients  ______________________________________________________________________________________    CHIEF COMPLAINT         Left wrist f/u    ASSESSMENT + PLAN    Postop day 15 from left volar mass excision    I reviewed the benign pathologic diagnosis.  I do not expect any local recurrence.  Sutures are absorbable.  You may get this wet, but should not soak it for one more week.  Advance activity as pain allows.  Work on the stretching exercises that I demonstrated. Contact my office if you would like a formal occupational therapy referral.     Follow up with any concerns.        HISTORY OF PRESENT ILLNESS       Patient returns today, postop day 15 from excision of left volar wrist mass just proximal to pisiform.  Pain decreasing appropriately.  No numbness or tingling.  No new concerns.      PHYSICAL EXAM       Patient did remove the bandage as instructed.  Incision clean, dry, intact with absorbable suture in place.  Full composite finger flexion extension with full intrinsic plus minus posture.  Symmetric wrist and forearm motion.  Sensation intact to light touch in all distributions.  Capillary refill less than 2 seconds.  2+ radial and ulnar pulses.  A little swelling at the surgical site as expected over the mass excision spot.        IMAGING / LABS / EMGs    Pathology report shows vascularized fibroadipose tissue with edema.      Electronically Signed      HERMINIA Verduzco MD      Orthopaedic Hand Surgery       [phone]

## 2024-07-26 NOTE — PROGRESS NOTES
CHIEF COMPLAINT         Left wrist f/u    ASSESSMENT + PLAN    Postop day 15 from left volar mass excision    I reviewed the benign pathologic diagnosis.  I do not expect any local recurrence.  Sutures are absorbable.  You may get this wet, but should not soak it for one more week.  Advance activity as pain allows.  Work on the stretching exercises that I demonstrated. Contact my office if you would like a formal occupational therapy referral.     Follow up with any concerns.        HISTORY OF PRESENT ILLNESS       Patient returns today, postop day 15 from excision of left volar wrist mass just proximal to pisiform.  Pain decreasing appropriately.  No numbness or tingling.  No new concerns.      PHYSICAL EXAM       Patient did remove the bandage as instructed.  Incision clean, dry, intact with absorbable suture in place.  Full composite finger flexion extension with full intrinsic plus minus posture.  Symmetric wrist and forearm motion.  Sensation intact to light touch in all distributions.  Capillary refill less than 2 seconds.  2+ radial and ulnar pulses.  A little swelling at the surgical site as expected over the mass excision spot.        IMAGING / LABS / EMGs    Pathology report shows vascularized fibroadipose tissue with edema.      Electronically Signed      HERMINIA Verduzco MD      Orthopaedic Hand Surgery      [phone]

## 2024-08-23 ENCOUNTER — OFFICE VISIT (OUTPATIENT)
Dept: PRIMARY CARE | Facility: CLINIC | Age: 34
End: 2024-08-23
Payer: COMMERCIAL

## 2024-08-23 VITALS
WEIGHT: 191 LBS | OXYGEN SATURATION: 97 % | BODY MASS INDEX: 26.64 KG/M2 | TEMPERATURE: 97.8 F | DIASTOLIC BLOOD PRESSURE: 70 MMHG | SYSTOLIC BLOOD PRESSURE: 112 MMHG | HEART RATE: 74 BPM

## 2024-08-23 DIAGNOSIS — N39.0 URINARY TRACT INFECTION WITHOUT HEMATURIA, SITE UNSPECIFIED: ICD-10-CM

## 2024-08-23 LAB
POC APPEARANCE, URINE: CLEAR
POC BILIRUBIN, URINE: ABNORMAL
POC BLOOD, URINE: NEGATIVE
POC COLOR, URINE: ABNORMAL
POC GLUCOSE, URINE: ABNORMAL MG/DL
POC KETONES, URINE: ABNORMAL MG/DL
POC LEUKOCYTES, URINE: ABNORMAL
POC NITRITE,URINE: POSITIVE
POC PH, URINE: 5 PH
POC PROTEIN, URINE: ABNORMAL MG/DL
POC SPECIFIC GRAVITY, URINE: 1.01
POC UROBILINOGEN, URINE: 4 EU/DL

## 2024-08-23 PROCEDURE — 99213 OFFICE O/P EST LOW 20 MIN: CPT | Performed by: FAMILY MEDICINE

## 2024-08-23 PROCEDURE — 81003 URINALYSIS AUTO W/O SCOPE: CPT | Performed by: FAMILY MEDICINE

## 2024-08-23 PROCEDURE — 87086 URINE CULTURE/COLONY COUNT: CPT

## 2024-08-23 PROCEDURE — 1036F TOBACCO NON-USER: CPT | Performed by: FAMILY MEDICINE

## 2024-08-23 RX ORDER — NITROFURANTOIN 25; 75 MG/1; MG/1
100 CAPSULE ORAL 2 TIMES DAILY
Qty: 14 CAPSULE | Refills: 0 | Status: SHIPPED | OUTPATIENT
Start: 2024-08-23 | End: 2024-08-30

## 2024-08-23 NOTE — PROGRESS NOTES
Assessment/Plan   ASSESSMENT/PLAN:      Patient Instructions   Likely UTI, will treat empirically with macrobid and send urine for culture     Amber Otto DO     FUTURE DIRECTION:     Subjective   SUBJECTIVE:     Patient ID: Veronica Cedeno is a 34 y.o. femalefor the following:    Urinary urgency   Ongoging for the past week   Feels similar to past UTI   Denies back pain, fever , chills,. Abd pain   Mentions taking Azo which helped with some of her symptoms       Review of Systems    Allergies   Allergen Reactions    Oxycodone Rash         Current Outpatient Medications:     busPIRone (Buspar) 15 mg tablet, Take 1 tablet (15 mg) by mouth once daily at bedtime., Disp: 90 tablet, Rfl: 3    FLUoxetine (PROzac) 40 mg capsule, TAKE ONE CAPSULE BY MOUTH EVERY DAY AT BEDTIME, Disp: 90 capsule, Rfl: 3    fluticasone (Flonase) 50 mcg/actuation nasal spray, Administer 1 spray into each nostril once daily. Shake gently. Before first use, prime pump. After use, clean tip and replace cap., Disp: 16 g, Rfl: 0    levonorgestrel (Mirena) 21 mcg/24 hours (8 yrs) 52 mg IUD, 52 mg by intrauterine route 1 time. Inserted 2/15/2024, Disp: , Rfl:     semaglutide (Ozempic) 1 mg/dose (2 mg/1.5 mL) pen injector, Inject 1 mg under the skin 1 (one) time per week., Disp: , Rfl:     traMADol (Ultram) 50 mg tablet, Take 1 tablet (50 mg) by mouth every 6 hours if needed for severe pain (7 - 10)., Disp: 12 tablet, Rfl: 0    nitrofurantoin, macrocrystal-monohydrate, (Macrobid) 100 mg capsule, Take 1 capsule (100 mg) by mouth 2 times a day for 7 days., Disp: 14 capsule, Rfl: 0     Patient Active Problem List   Diagnosis    Anxiety    Routine postpartum follow-up ([facility])    Intrauterine device surveillance    Dorsal abutment syndrome of wrist, left       Social History     Socioeconomic History    Marital status:      Spouse name: Not on file    Number of children: 3    Years of education: Not on file    Highest education level:  Not on file   Occupational History    Not on file   Tobacco Use    Smoking status: Never    Smokeless tobacco: Never    Tobacco comments:     No history of anesthesia reactions. No family history of MH.     Had a cold this month with lingering hoarseness, no fever, no other symptoms.     Does not get SOB with a flight of stairs.     Does not use a cane, walker, or wheelchair.         Vaping Use    Vaping status: Never Used   Substance and Sexual Activity    Alcohol use: Never    Drug use: Never    Sexual activity: Yes     Partners: Male     Birth control/protection: I.U.D.   Other Topics Concern    Not on file   Social History Narrative    Not on file     Social Determinants of Health     Financial Resource Strain: Not on file   Food Insecurity: Not on file   Transportation Needs: Not on file   Physical Activity: Not on file   Stress: Not on file   Social Connections: Not on file   Intimate Partner Violence: Not on file       Objective   OBJECTIVE:     Vitals:    08/23/24 1424   BP: 112/70   Pulse: 74   Temp: 36.6 °C (97.8 °F)   SpO2: 97%       Exam      Physical Exam  Constitutional:       Appearance: Normal appearance.   HENT:      Head: Normocephalic.   Pulmonary:      Effort: Pulmonary effort is normal.   Abdominal:      Tenderness: There is no abdominal tenderness. There is no right CVA tenderness or left CVA tenderness.   Musculoskeletal:      Cervical back: Normal range of motion.   Neurological:      Mental Status: She is alert.   Psychiatric:         Mood and Affect: Mood normal.           Results for orders placed or performed in visit on 08/23/24   POCT UA Automated manually resulted   Result Value Ref Range    POC Color, Urine Orange (A) Straw, Yellow, Light-Yellow    POC Appearance, Urine Clear Clear    POC Glucose, Urine 250 (2+) (A) NEGATIVE mg/dl    POC Bilirubin, Urine SMALL (1+) (A) NEGATIVE    POC Ketones, Urine 15 (1+) (A) NEGATIVE mg/dl    POC Specific Gravity, Urine 1.010 1.005 - 1.035    POC  Blood, Urine NEGATIVE NEGATIVE    POC PH, Urine 5.0 No Reference Range Established PH    POC Protein, Urine 100 (2+) (A) NEGATIVE, 30 (1+) mg/dl    POC Urobilinogen, Urine 4.0 (A) 0.2, 1.0 EU/DL    Poc Nitrite, Urine POSITIVE (A) NEGATIVE    POC Leukocytes, Urine LARGE (3+) (A) NEGATIVE

## 2024-08-25 LAB — BACTERIA UR CULT: NORMAL

## 2024-11-26 ENCOUNTER — APPOINTMENT (OUTPATIENT)
Dept: PRIMARY CARE | Facility: CLINIC | Age: 34
End: 2024-11-26
Payer: COMMERCIAL

## 2025-03-11 ENCOUNTER — APPOINTMENT (OUTPATIENT)
Dept: OBSTETRICS AND GYNECOLOGY | Facility: CLINIC | Age: 35
End: 2025-03-11
Payer: COMMERCIAL

## 2025-04-14 ENCOUNTER — APPOINTMENT (OUTPATIENT)
Dept: OBSTETRICS AND GYNECOLOGY | Facility: CLINIC | Age: 35
End: 2025-04-14
Payer: COMMERCIAL

## 2025-04-14 VITALS
DIASTOLIC BLOOD PRESSURE: 62 MMHG | SYSTOLIC BLOOD PRESSURE: 108 MMHG | WEIGHT: 167.33 LBS | BODY MASS INDEX: 23.34 KG/M2

## 2025-04-14 DIAGNOSIS — F41.9 ANXIETY AND DEPRESSION: ICD-10-CM

## 2025-04-14 DIAGNOSIS — Z12.31 ENCOUNTER FOR SCREENING MAMMOGRAM FOR MALIGNANT NEOPLASM OF BREAST: ICD-10-CM

## 2025-04-14 DIAGNOSIS — F32.A ANXIETY AND DEPRESSION: ICD-10-CM

## 2025-04-14 DIAGNOSIS — Z01.419 ENCOUNTER FOR WELL WOMAN EXAM WITH ROUTINE GYNECOLOGICAL EXAM: Primary | ICD-10-CM

## 2025-04-14 PROCEDURE — 99395 PREV VISIT EST AGE 18-39: CPT | Performed by: NURSE PRACTITIONER

## 2025-04-14 PROCEDURE — 1036F TOBACCO NON-USER: CPT | Performed by: NURSE PRACTITIONER

## 2025-04-14 RX ORDER — BUSPIRONE HYDROCHLORIDE 15 MG/1
15 TABLET ORAL NIGHTLY
Qty: 90 TABLET | Refills: 3 | Status: SHIPPED | OUTPATIENT
Start: 2025-04-14

## 2025-04-14 RX ORDER — FLUOXETINE HYDROCHLORIDE 40 MG/1
40 CAPSULE ORAL NIGHTLY
Qty: 90 CAPSULE | Refills: 3 | Status: SHIPPED | OUTPATIENT
Start: 2025-04-14 | End: 2025-04-14

## 2025-04-14 RX ORDER — BUSPIRONE HYDROCHLORIDE 15 MG/1
15 TABLET ORAL NIGHTLY
Qty: 90 TABLET | Refills: 3 | Status: SHIPPED | OUTPATIENT
Start: 2025-04-14 | End: 2025-04-14

## 2025-04-14 RX ORDER — FLUOXETINE HYDROCHLORIDE 40 MG/1
40 CAPSULE ORAL NIGHTLY
Qty: 90 CAPSULE | Refills: 3 | Status: SHIPPED | OUTPATIENT
Start: 2025-04-14

## 2025-04-14 NOTE — PROGRESS NOTES
HPI:   Veronica Cedeno is a 34 y.o. who presents today for her annual gynecologic exam with complaints    She has the following concerns; desires mammogram screen d/t family hx of breast cancer dx at age 35. Periods are infrequent with Mirena. PAP is up to date.     GYN HISTORY:  Periods are rare, lasting 3 days.   Dysmenorrhea:mild, occurring throughout menses. Cyclic symptoms include none.   No intermenstrual bleeding, spotting, or discharge.    Current contraception: IUD      Requests STD testing: no     PAP History   Last pap:      Normal HPV Negative  History of abnormal pap: no  HPV vaccine: no  @paphx@    Health Screening  Family history of breast, uterine, ovarian or colon cancer: yes - paternal grandmother  from breast cancer.     Breast cancer: mammogram - due at age 39.   Colon cancer: due at age 44         The patient feels safe at home.         Review of Systems:   Constitutional: no fever and no chills.  Cardiovascular: no chest pain.   Respiratory: no shortness of breath.   Gastrointestinal: no nausea, no abdominal pain and no constipation  Genitourinary: no dysuria, no urinary incontinence, no vaginal dryness, no pelvic pain and no vaginal discharge.   Neurological: no headache.  Psychiatric: no anxiety and no depression.              Objective         /62   Wt 75.9 kg (167 lb 5.3 oz)   LMP 2025   BMI 23.34 kg/m²         Physical Exam:   Constitutional: Alert and in no acute distress. Well developed, well nourished.      Neck: No neck asymmetry. Supple. Thyroid not enlarged and there were no palpable thyroid nodules.      Cardiovascular: Heart rate and rhythm were normal, normal S1 and S2, no gallops, and no murmurs.      Pulmonary: No respiratory distress. Clear bilateral breath sounds.      Chest: Breasts: Normal appearance, no nipple discharge and no skin changes. Palpation of breasts and axillae: No palpable mass and no axillary lymphadenopathy. + fibrocystic breasts.       Abdomen: Soft nontender; no abdominal mass palpated. Normal bowel sounds. No organomegaly.      Genitourinary:   - External genitalia: Normal.   - Palpation of lymph nodes in groin: No inguinal lymphadenopathy.   - Bartholin's Urethral and Skenes Glands: Normal.   - Urethra: Normal.    -Bladder: Normal on palpation.   - Vagina: Normal.   - Cervix: Normal.   - Uterus: Normal. Right Adnexa/parametria: Normal. Left Adnexa/parametria: Normal.   - Perianal Area: Normal.      Skin: Normal skin color and pigmentation, normal skin turgor, and no rash     Psychiatric: Alert and oriented x 3. Affect normal to patient baseline. Mood: Appropriate.            Assessment/Plan       Diagnoses and all orders for this visit:  Encounter for well woman exam with routine gynecological exam  Here for well woman exam. PAP is up to date. Mammogram ordered. She will maintain her IUD at this time. Buspar and prozac refilled while pt is awaiting new PCP appointment.   Anxiety and depression  -     busPIRone (Buspar) 15 mg tablet; Take 1 tablet (15 mg) by mouth once daily at bedtime.  -     FLUoxetine (PROzac) 40 mg capsule; Take 1 capsule (40 mg) by mouth once daily at bedtime. Take at bedtime.  Encounter for screening mammogram for malignant neoplasm of breast  Screening mammogram   Follow-up annually; sooner if needed.        MK Elkins-CNP

## 2025-06-24 ENCOUNTER — APPOINTMENT (OUTPATIENT)
Dept: OBSTETRICS AND GYNECOLOGY | Facility: CLINIC | Age: 35
End: 2025-06-24
Payer: COMMERCIAL

## (undated) DEVICE — BANDAGE, ESMARK 4 IN X 9 FT, STERILE

## (undated) DEVICE — SUTURE, MONOCRYLIC, 4-0, P3, MONO 18

## (undated) DEVICE — NEEDLE, HYPODERMIC, REGULAR WALL, REGULAR BEVEL, 18 G X 1.5 IN

## (undated) DEVICE — DRAPE, SHEET, HAND, GUSSETTED, W/TABLE EXTENSION, 77 X 146 IN, DISPOSABLE, LF, STERILE

## (undated) DEVICE — SPONGE, GAUZE, XRAY DECT, 16 PLY, 4 X 4, W/MASTER DMT,STERILE

## (undated) DEVICE — BANDAGE, PLASTER, FAST SETTING, 6 IN X 5 YD

## (undated) DEVICE — SUTURE, VICRYL, 4-0, 18 IN, P-3, UNDYED

## (undated) DEVICE — DRESSING, GAUZE, PETROLATUM, PATCH, XEROFORM, 1 X 8 IN, STERILE

## (undated) DEVICE — PREP TRAY, SKIN, DRY, W/GLOVES

## (undated) DEVICE — DRESSING, GAUZE, 4 X 4 IN, LF, STERILE

## (undated) DEVICE — PACK, BASIC

## (undated) DEVICE — CUFF, TOURNIQUET, SINGLE BLADDER, DUAL PORT, 18 IN, RED, DISPOSABLE

## (undated) DEVICE — GLOVE, SURGICAL, PROTEXIS PI MICRO, 7.0, PF, LF

## (undated) DEVICE — GLOVE, SURGICAL, PROTEXIS PI , 7.5, PF, LF

## (undated) DEVICE — STOCKINETTE, DOUBLE PLY, 4 X 48 IN, STERILE

## (undated) DEVICE — CORD, BIPOLAR,  12 FT, DISPOSABLE, LF

## (undated) DEVICE — STRIP, SKIN CLOSURE, STERI-STRIP, REINFORCED, 0.25 X 3 IN